# Patient Record
Sex: MALE | Race: BLACK OR AFRICAN AMERICAN | NOT HISPANIC OR LATINO | Employment: FULL TIME | URBAN - METROPOLITAN AREA
[De-identification: names, ages, dates, MRNs, and addresses within clinical notes are randomized per-mention and may not be internally consistent; named-entity substitution may affect disease eponyms.]

---

## 2021-01-04 ENCOUNTER — OFFICE VISIT (OUTPATIENT)
Dept: FAMILY MEDICINE | Facility: CLINIC | Age: 26
End: 2021-01-04
Payer: COMMERCIAL

## 2021-01-04 ENCOUNTER — TELEPHONE (OUTPATIENT)
Dept: FAMILY MEDICINE | Facility: CLINIC | Age: 26
End: 2021-01-04

## 2021-01-04 VITALS
BODY MASS INDEX: 40.43 KG/M2 | HEIGHT: 74 IN | DIASTOLIC BLOOD PRESSURE: 82 MMHG | TEMPERATURE: 97 F | OXYGEN SATURATION: 97 % | RESPIRATION RATE: 18 BRPM | HEART RATE: 105 BPM | WEIGHT: 315 LBS | SYSTOLIC BLOOD PRESSURE: 110 MMHG

## 2021-01-04 DIAGNOSIS — Z23 NEED FOR PROPHYLACTIC VACCINATION AGAINST STREPTOCOCCUS PNEUMONIAE (PNEUMOCOCCUS): ICD-10-CM

## 2021-01-04 DIAGNOSIS — Z00.00 ANNUAL PHYSICAL EXAM: Primary | ICD-10-CM

## 2021-01-04 DIAGNOSIS — Z23 NEED FOR INFLUENZA VACCINATION: ICD-10-CM

## 2021-01-04 DIAGNOSIS — E66.01 MORBID OBESITY WITH BMI OF 40.0-44.9, ADULT: ICD-10-CM

## 2021-01-04 PROCEDURE — 90471 IMMUNIZATION ADMIN: CPT | Mod: S$GLB,,, | Performed by: FAMILY MEDICINE

## 2021-01-04 PROCEDURE — 90686 IIV4 VACC NO PRSV 0.5 ML IM: CPT | Mod: S$GLB,,, | Performed by: FAMILY MEDICINE

## 2021-01-04 PROCEDURE — 99385 PR PREVENTIVE VISIT,NEW,18-39: ICD-10-PCS | Mod: 25,S$GLB,, | Performed by: FAMILY MEDICINE

## 2021-01-04 PROCEDURE — 90472 PNEUMOCOCCAL POLYSACCHARIDE VACCINE 23-VALENT =>2YO SQ IM: ICD-10-PCS | Mod: S$GLB,,, | Performed by: FAMILY MEDICINE

## 2021-01-04 PROCEDURE — 90471 FLU VACCINE (QUAD) GREATER THAN OR EQUAL TO 3YO PRESERVATIVE FREE IM: ICD-10-PCS | Mod: S$GLB,,, | Performed by: FAMILY MEDICINE

## 2021-01-04 PROCEDURE — 90732 PNEUMOCOCCAL POLYSACCHARIDE VACCINE 23-VALENT =>2YO SQ IM: ICD-10-PCS | Mod: S$GLB,,, | Performed by: FAMILY MEDICINE

## 2021-01-04 PROCEDURE — 90732 PPSV23 VACC 2 YRS+ SUBQ/IM: CPT | Mod: S$GLB,,, | Performed by: FAMILY MEDICINE

## 2021-01-04 PROCEDURE — 90686 FLU VACCINE (QUAD) GREATER THAN OR EQUAL TO 3YO PRESERVATIVE FREE IM: ICD-10-PCS | Mod: S$GLB,,, | Performed by: FAMILY MEDICINE

## 2021-01-04 PROCEDURE — 99999 PR PBB SHADOW E&M-NEW PATIENT-LVL IV: CPT | Mod: PBBFAC,,, | Performed by: FAMILY MEDICINE

## 2021-01-04 PROCEDURE — 99385 PREV VISIT NEW AGE 18-39: CPT | Mod: 25,S$GLB,, | Performed by: FAMILY MEDICINE

## 2021-01-04 PROCEDURE — 99999 PR PBB SHADOW E&M-NEW PATIENT-LVL IV: ICD-10-PCS | Mod: PBBFAC,,, | Performed by: FAMILY MEDICINE

## 2021-01-04 PROCEDURE — 90472 IMMUNIZATION ADMIN EACH ADD: CPT | Mod: S$GLB,,, | Performed by: FAMILY MEDICINE

## 2021-01-04 RX ORDER — FEXOFENADINE HCL 60 MG
60 TABLET ORAL DAILY PRN
COMMUNITY

## 2021-01-27 ENCOUNTER — LAB VISIT (OUTPATIENT)
Dept: LAB | Facility: HOSPITAL | Age: 26
End: 2021-01-27
Attending: FAMILY MEDICINE
Payer: COMMERCIAL

## 2021-01-27 DIAGNOSIS — Z00.00 ANNUAL PHYSICAL EXAM: ICD-10-CM

## 2021-01-27 PROCEDURE — 84443 ASSAY THYROID STIM HORMONE: CPT

## 2021-01-27 PROCEDURE — 85025 COMPLETE CBC W/AUTO DIFF WBC: CPT

## 2021-01-27 PROCEDURE — 80061 LIPID PANEL: CPT

## 2021-01-27 PROCEDURE — 86703 HIV-1/HIV-2 1 RESULT ANTBDY: CPT

## 2021-01-27 PROCEDURE — 86803 HEPATITIS C AB TEST: CPT

## 2021-01-27 PROCEDURE — 80053 COMPREHEN METABOLIC PANEL: CPT

## 2021-01-27 PROCEDURE — 36415 COLL VENOUS BLD VENIPUNCTURE: CPT | Mod: PO

## 2021-01-28 LAB
ALBUMIN SERPL BCP-MCNC: 4.1 G/DL (ref 3.5–5.2)
ALP SERPL-CCNC: 69 U/L (ref 55–135)
ALT SERPL W/O P-5'-P-CCNC: 28 U/L (ref 10–44)
ANION GAP SERPL CALC-SCNC: 11 MMOL/L (ref 8–16)
AST SERPL-CCNC: 31 U/L (ref 10–40)
BASOPHILS # BLD AUTO: 0.03 K/UL (ref 0–0.2)
BASOPHILS NFR BLD: 0.5 % (ref 0–1.9)
BILIRUB SERPL-MCNC: 0.5 MG/DL (ref 0.1–1)
BUN SERPL-MCNC: 19 MG/DL (ref 6–20)
CALCIUM SERPL-MCNC: 9.5 MG/DL (ref 8.7–10.5)
CHLORIDE SERPL-SCNC: 104 MMOL/L (ref 95–110)
CHOLEST SERPL-MCNC: 185 MG/DL (ref 120–199)
CHOLEST/HDLC SERPL: 2.9 {RATIO} (ref 2–5)
CO2 SERPL-SCNC: 23 MMOL/L (ref 23–29)
CREAT SERPL-MCNC: 1.1 MG/DL (ref 0.5–1.4)
DIFFERENTIAL METHOD: ABNORMAL
EOSINOPHIL # BLD AUTO: 0 K/UL (ref 0–0.5)
EOSINOPHIL NFR BLD: 0.5 % (ref 0–8)
ERYTHROCYTE [DISTWIDTH] IN BLOOD BY AUTOMATED COUNT: 12.9 % (ref 11.5–14.5)
EST. GFR  (AFRICAN AMERICAN): >60 ML/MIN/1.73 M^2
EST. GFR  (NON AFRICAN AMERICAN): >60 ML/MIN/1.73 M^2
GLUCOSE SERPL-MCNC: 65 MG/DL (ref 70–110)
HCT VFR BLD AUTO: 47.6 % (ref 40–54)
HCV AB SERPL QL IA: NEGATIVE
HDLC SERPL-MCNC: 64 MG/DL (ref 40–75)
HDLC SERPL: 34.6 % (ref 20–50)
HGB BLD-MCNC: 15.4 G/DL (ref 14–18)
HIV 1+2 AB+HIV1 P24 AG SERPL QL IA: NEGATIVE
IMM GRANULOCYTES # BLD AUTO: 0.03 K/UL (ref 0–0.04)
IMM GRANULOCYTES NFR BLD AUTO: 0.5 % (ref 0–0.5)
LDLC SERPL CALC-MCNC: 112 MG/DL (ref 63–159)
LYMPHOCYTES # BLD AUTO: 2 K/UL (ref 1–4.8)
LYMPHOCYTES NFR BLD: 33.7 % (ref 18–48)
MCH RBC QN AUTO: 31.4 PG (ref 27–31)
MCHC RBC AUTO-ENTMCNC: 32.4 G/DL (ref 32–36)
MCV RBC AUTO: 97 FL (ref 82–98)
MONOCYTES # BLD AUTO: 0.5 K/UL (ref 0.3–1)
MONOCYTES NFR BLD: 8.8 % (ref 4–15)
NEUTROPHILS # BLD AUTO: 3.4 K/UL (ref 1.8–7.7)
NEUTROPHILS NFR BLD: 56 % (ref 38–73)
NONHDLC SERPL-MCNC: 121 MG/DL
NRBC BLD-RTO: 0 /100 WBC
PLATELET # BLD AUTO: 245 K/UL (ref 150–350)
PMV BLD AUTO: 10.7 FL (ref 9.2–12.9)
POTASSIUM SERPL-SCNC: 4.4 MMOL/L (ref 3.5–5.1)
PROT SERPL-MCNC: 7.6 G/DL (ref 6–8.4)
RBC # BLD AUTO: 4.91 M/UL (ref 4.6–6.2)
SODIUM SERPL-SCNC: 138 MMOL/L (ref 136–145)
TRIGL SERPL-MCNC: 45 MG/DL (ref 30–150)
TSH SERPL DL<=0.005 MIU/L-ACNC: 0.62 UIU/ML (ref 0.4–4)
WBC # BLD AUTO: 6.03 K/UL (ref 3.9–12.7)

## 2021-02-19 ENCOUNTER — PATIENT MESSAGE (OUTPATIENT)
Dept: FAMILY MEDICINE | Facility: CLINIC | Age: 26
End: 2021-02-19

## 2021-03-06 ENCOUNTER — IMMUNIZATION (OUTPATIENT)
Dept: PRIMARY CARE CLINIC | Facility: CLINIC | Age: 26
End: 2021-03-06
Payer: COMMERCIAL

## 2021-03-06 DIAGNOSIS — Z23 NEED FOR VACCINATION: Primary | ICD-10-CM

## 2021-03-06 PROCEDURE — 0001A PR IMMUNIZ ADMIN, SARS-COV-2 COVID-19 VACC, 30MCG/0.3ML, 1ST DOSE: ICD-10-PCS | Mod: CV19,S$GLB,, | Performed by: INTERNAL MEDICINE

## 2021-03-06 PROCEDURE — 0001A PR IMMUNIZ ADMIN, SARS-COV-2 COVID-19 VACC, 30MCG/0.3ML, 1ST DOSE: CPT | Mod: CV19,S$GLB,, | Performed by: INTERNAL MEDICINE

## 2021-03-06 PROCEDURE — 91300 PR SARS-COV- 2 COVID-19 VACCINE, NO PRSV, 30MCG/0.3ML, IM: ICD-10-PCS | Mod: S$GLB,,, | Performed by: INTERNAL MEDICINE

## 2021-03-06 PROCEDURE — 91300 PR SARS-COV- 2 COVID-19 VACCINE, NO PRSV, 30MCG/0.3ML, IM: CPT | Mod: S$GLB,,, | Performed by: INTERNAL MEDICINE

## 2021-03-06 RX ADMIN — Medication 0.3 ML: at 10:03

## 2021-03-27 ENCOUNTER — IMMUNIZATION (OUTPATIENT)
Dept: PRIMARY CARE CLINIC | Facility: CLINIC | Age: 26
End: 2021-03-27
Payer: COMMERCIAL

## 2021-03-27 DIAGNOSIS — Z23 NEED FOR VACCINATION: Primary | ICD-10-CM

## 2021-03-27 PROCEDURE — 0002A PR IMMUNIZ ADMIN, SARS-COV-2 COVID-19 VACC, 30MCG/0.3ML, 2ND DOSE: ICD-10-PCS | Mod: CV19,S$GLB,, | Performed by: INTERNAL MEDICINE

## 2021-03-27 PROCEDURE — 91300 PR SARS-COV- 2 COVID-19 VACCINE, NO PRSV, 30MCG/0.3ML, IM: ICD-10-PCS | Mod: S$GLB,,, | Performed by: INTERNAL MEDICINE

## 2021-03-27 PROCEDURE — 0002A PR IMMUNIZ ADMIN, SARS-COV-2 COVID-19 VACC, 30MCG/0.3ML, 2ND DOSE: CPT | Mod: CV19,S$GLB,, | Performed by: INTERNAL MEDICINE

## 2021-03-27 PROCEDURE — 91300 PR SARS-COV- 2 COVID-19 VACCINE, NO PRSV, 30MCG/0.3ML, IM: CPT | Mod: S$GLB,,, | Performed by: INTERNAL MEDICINE

## 2021-03-27 RX ADMIN — Medication 0.3 ML: at 10:03

## 2021-04-06 ENCOUNTER — TELEPHONE (OUTPATIENT)
Dept: FAMILY MEDICINE | Facility: CLINIC | Age: 26
End: 2021-04-06

## 2021-04-14 ENCOUNTER — TELEPHONE (OUTPATIENT)
Dept: FAMILY MEDICINE | Facility: CLINIC | Age: 26
End: 2021-04-14

## 2021-04-15 ENCOUNTER — OFFICE VISIT (OUTPATIENT)
Dept: FAMILY MEDICINE | Facility: CLINIC | Age: 26
End: 2021-04-15
Payer: COMMERCIAL

## 2021-04-15 VITALS
WEIGHT: 315 LBS | DIASTOLIC BLOOD PRESSURE: 86 MMHG | BODY MASS INDEX: 40.43 KG/M2 | OXYGEN SATURATION: 98 % | TEMPERATURE: 97 F | HEART RATE: 86 BPM | SYSTOLIC BLOOD PRESSURE: 122 MMHG | HEIGHT: 74 IN

## 2021-04-15 DIAGNOSIS — M25.532 BILATERAL WRIST PAIN: Primary | ICD-10-CM

## 2021-04-15 DIAGNOSIS — M25.531 BILATERAL WRIST PAIN: Primary | ICD-10-CM
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
## 2021-04-15 PROCEDURE — 99999 PR PBB SHADOW E&M-EST. PATIENT-LVL III: CPT | Mod: PBBFAC,,, | Performed by: INTERNAL MEDICINE

## 2021-04-15 PROCEDURE — 99214 PR OFFICE/OUTPT VISIT, EST, LEVL IV, 30-39 MIN: ICD-10-PCS | Mod: S$GLB,,, | Performed by: INTERNAL MEDICINE

## 2021-04-15 PROCEDURE — 99214 OFFICE O/P EST MOD 30 MIN: CPT | Mod: S$GLB,,, | Performed by: INTERNAL MEDICINE

## 2021-04-15 PROCEDURE — 99999 PR PBB SHADOW E&M-EST. PATIENT-LVL III: ICD-10-PCS | Mod: PBBFAC,,, | Performed by: INTERNAL MEDICINE

## 2021-08-18 ENCOUNTER — CLINICAL SUPPORT (OUTPATIENT)
Dept: URGENT CARE | Facility: CLINIC | Age: 26
End: 2021-08-18
Payer: COMMERCIAL

## 2021-08-18 ENCOUNTER — LAB VISIT (OUTPATIENT)
Dept: PRIMARY CARE CLINIC | Facility: OTHER | Age: 26
End: 2021-08-18
Payer: COMMERCIAL

## 2021-08-18 DIAGNOSIS — Z20.822 ENCOUNTER FOR LABORATORY TESTING FOR COVID-19 VIRUS: ICD-10-CM

## 2021-08-18 DIAGNOSIS — Z11.52 ENCOUNTER FOR SCREENING FOR COVID-19: Primary | ICD-10-CM

## 2021-08-18 LAB
CTP QC/QA: YES
SARS-COV-2 RDRP RESP QL NAA+PROBE: NEGATIVE

## 2021-08-18 PROCEDURE — 99211 OFF/OP EST MAY X REQ PHY/QHP: CPT | Mod: S$GLB,CS,, | Performed by: NURSE PRACTITIONER

## 2021-08-18 PROCEDURE — U0002: ICD-10-PCS | Mod: QW,S$GLB,, | Performed by: NURSE PRACTITIONER

## 2021-08-18 PROCEDURE — 99211 PR OFFICE/OUTPT VISIT, EST, LEVL I: ICD-10-PCS | Mod: S$GLB,CS,, | Performed by: NURSE PRACTITIONER

## 2021-08-18 PROCEDURE — U0002 COVID-19 LAB TEST NON-CDC: HCPCS | Mod: QW,S$GLB,, | Performed by: NURSE PRACTITIONER

## 2021-08-18 PROCEDURE — U0003 INFECTIOUS AGENT DETECTION BY NUCLEIC ACID (DNA OR RNA); SEVERE ACUTE RESPIRATORY SYNDROME CORONAVIRUS 2 (SARS-COV-2) (CORONAVIRUS DISEASE [COVID-19]), AMPLIFIED PROBE TECHNIQUE, MAKING USE OF HIGH THROUGHPUT TECHNOLOGIES AS DESCRIBED BY CMS-2020-01-R: HCPCS

## 2021-08-20 LAB
SARS-COV-2 RNA RESP QL NAA+PROBE: NORMAL
TEST PERFORMANCE INFO SPEC: NORMAL

## 2021-10-26 ENCOUNTER — OFFICE VISIT (OUTPATIENT)
Dept: URGENT CARE | Facility: CLINIC | Age: 26
End: 2021-10-26
Payer: COMMERCIAL

## 2021-10-26 VITALS
DIASTOLIC BLOOD PRESSURE: 72 MMHG | BODY MASS INDEX: 40.43 KG/M2 | SYSTOLIC BLOOD PRESSURE: 126 MMHG | HEIGHT: 74 IN | HEART RATE: 93 BPM | RESPIRATION RATE: 18 BRPM | OXYGEN SATURATION: 98 % | WEIGHT: 315 LBS | TEMPERATURE: 98 F

## 2021-10-26 DIAGNOSIS — J45.909 MODERATE ASTHMA, UNSPECIFIED WHETHER COMPLICATED, UNSPECIFIED WHETHER PERSISTENT: Primary | ICD-10-CM

## 2021-10-26 DIAGNOSIS — R05.9 COUGH: ICD-10-CM

## 2021-10-26 PROCEDURE — 99213 PR OFFICE/OUTPT VISIT, EST, LEVL III, 20-29 MIN: ICD-10-PCS | Mod: S$GLB,,, | Performed by: PHYSICIAN ASSISTANT

## 2021-10-26 PROCEDURE — 99213 OFFICE O/P EST LOW 20 MIN: CPT | Mod: S$GLB,,, | Performed by: PHYSICIAN ASSISTANT

## 2021-10-26 RX ORDER — ALBUTEROL SULFATE 90 UG/1
2 AEROSOL, METERED RESPIRATORY (INHALATION) EVERY 6 HOURS PRN
Qty: 8 G | Refills: 0 | Status: SHIPPED | OUTPATIENT
Start: 2021-10-26 | End: 2023-11-18 | Stop reason: SDUPTHER

## 2021-10-26 RX ORDER — METHYLPREDNISOLONE 4 MG/1
TABLET ORAL
Qty: 1 EACH | Refills: 0 | Status: SHIPPED | OUTPATIENT
Start: 2021-10-26 | End: 2023-04-18

## 2021-10-26 RX ORDER — ALBUTEROL SULFATE 0.63 MG/3ML
0.63 SOLUTION RESPIRATORY (INHALATION) EVERY 6 HOURS PRN
COMMUNITY

## 2021-10-26 RX ORDER — METHYLPREDNISOLONE 4 MG/1
4 TABLET ORAL
COMMUNITY
End: 2021-10-26

## 2021-10-29 ENCOUNTER — TELEPHONE (OUTPATIENT)
Dept: URGENT CARE | Facility: CLINIC | Age: 26
End: 2021-10-29
Payer: COMMERCIAL

## 2021-12-09 ENCOUNTER — TELEPHONE (OUTPATIENT)
Dept: FAMILY MEDICINE | Facility: CLINIC | Age: 26
End: 2021-12-09
Payer: COMMERCIAL

## 2021-12-16 ENCOUNTER — IMMUNIZATION (OUTPATIENT)
Dept: PRIMARY CARE CLINIC | Facility: CLINIC | Age: 26
End: 2021-12-16
Payer: COMMERCIAL

## 2021-12-16 ENCOUNTER — OFFICE VISIT (OUTPATIENT)
Dept: FAMILY MEDICINE | Facility: CLINIC | Age: 26
End: 2021-12-16
Payer: COMMERCIAL

## 2021-12-16 ENCOUNTER — TELEPHONE (OUTPATIENT)
Dept: SPORTS MEDICINE | Facility: CLINIC | Age: 26
End: 2021-12-16
Payer: COMMERCIAL

## 2021-12-16 VITALS
SYSTOLIC BLOOD PRESSURE: 135 MMHG | BODY MASS INDEX: 40.43 KG/M2 | OXYGEN SATURATION: 97 % | WEIGHT: 315 LBS | TEMPERATURE: 99 F | DIASTOLIC BLOOD PRESSURE: 75 MMHG | HEART RATE: 100 BPM | HEIGHT: 74 IN

## 2021-12-16 DIAGNOSIS — Z23 NEED FOR VACCINATION: Primary | ICD-10-CM

## 2021-12-16 DIAGNOSIS — M25.571 ACUTE RIGHT ANKLE PAIN: Primary | ICD-10-CM

## 2021-12-16 PROCEDURE — 90686 IIV4 VACC NO PRSV 0.5 ML IM: CPT | Mod: S$GLB,,, | Performed by: FAMILY MEDICINE

## 2021-12-16 PROCEDURE — 99999 PR PBB SHADOW E&M-EST. PATIENT-LVL IV: CPT | Mod: PBBFAC,,, | Performed by: FAMILY MEDICINE

## 2021-12-16 PROCEDURE — 90686 FLU VACCINE (QUAD) GREATER THAN OR EQUAL TO 3YO PRESERVATIVE FREE IM: ICD-10-PCS | Mod: S$GLB,,, | Performed by: FAMILY MEDICINE

## 2021-12-16 PROCEDURE — 99214 PR OFFICE/OUTPT VISIT, EST, LEVL IV, 30-39 MIN: ICD-10-PCS | Mod: 25,S$GLB,, | Performed by: FAMILY MEDICINE

## 2021-12-16 PROCEDURE — 99999 PR PBB SHADOW E&M-EST. PATIENT-LVL IV: ICD-10-PCS | Mod: PBBFAC,,, | Performed by: FAMILY MEDICINE

## 2021-12-16 PROCEDURE — 90471 IMMUNIZATION ADMIN: CPT | Mod: S$GLB,,, | Performed by: FAMILY MEDICINE

## 2021-12-16 PROCEDURE — 99214 OFFICE O/P EST MOD 30 MIN: CPT | Mod: 25,S$GLB,, | Performed by: FAMILY MEDICINE

## 2021-12-16 PROCEDURE — 0064A COVID-19, MRNA, LNP-S, PF, 100 MCG/0.25 ML DOSE VACCINE (MODERNA BOOSTER): CPT | Mod: CV19,PBBFAC | Performed by: FAMILY MEDICINE

## 2021-12-16 PROCEDURE — 90471 FLU VACCINE (QUAD) GREATER THAN OR EQUAL TO 3YO PRESERVATIVE FREE IM: ICD-10-PCS | Mod: S$GLB,,, | Performed by: FAMILY MEDICINE

## 2021-12-16 RX ORDER — NAPROXEN 250 MG/1
TABLET ORAL
COMMUNITY
Start: 2021-11-07

## 2021-12-21 ENCOUNTER — PATIENT MESSAGE (OUTPATIENT)
Dept: INFECTIOUS DISEASES | Facility: HOSPITAL | Age: 26
End: 2021-12-21
Payer: COMMERCIAL

## 2021-12-21 ENCOUNTER — OFFICE VISIT (OUTPATIENT)
Dept: URGENT CARE | Facility: CLINIC | Age: 26
End: 2021-12-21
Payer: COMMERCIAL

## 2021-12-21 VITALS
RESPIRATION RATE: 18 BRPM | TEMPERATURE: 103 F | SYSTOLIC BLOOD PRESSURE: 134 MMHG | HEART RATE: 129 BPM | DIASTOLIC BLOOD PRESSURE: 71 MMHG | HEIGHT: 74 IN | BODY MASS INDEX: 40.43 KG/M2 | WEIGHT: 315 LBS | OXYGEN SATURATION: 97 %

## 2021-12-21 DIAGNOSIS — U07.1 COVID-19 VIRUS DETECTED: ICD-10-CM

## 2021-12-21 DIAGNOSIS — U07.1 COVID-19: Primary | ICD-10-CM

## 2021-12-21 DIAGNOSIS — R05.9 COUGH: ICD-10-CM

## 2021-12-21 DIAGNOSIS — R50.81 FEVER IN OTHER DISEASES: ICD-10-CM

## 2021-12-21 LAB
CTP QC/QA: YES
CTP QC/QA: YES
POC MOLECULAR INFLUENZA A AGN: NEGATIVE
POC MOLECULAR INFLUENZA B AGN: NEGATIVE
SARS-COV-2 RDRP RESP QL NAA+PROBE: POSITIVE

## 2021-12-21 PROCEDURE — 99214 PR OFFICE/OUTPT VISIT, EST, LEVL IV, 30-39 MIN: ICD-10-PCS | Mod: S$GLB,,, | Performed by: PHYSICIAN ASSISTANT

## 2021-12-21 PROCEDURE — 87502 INFLUENZA DNA AMP PROBE: CPT | Mod: QW,S$GLB,, | Performed by: PHYSICIAN ASSISTANT

## 2021-12-21 PROCEDURE — U0002 COVID-19 LAB TEST NON-CDC: HCPCS | Mod: QW,S$GLB,, | Performed by: PHYSICIAN ASSISTANT

## 2021-12-21 PROCEDURE — 99214 OFFICE O/P EST MOD 30 MIN: CPT | Mod: S$GLB,,, | Performed by: PHYSICIAN ASSISTANT

## 2021-12-21 PROCEDURE — U0002: ICD-10-PCS | Mod: QW,S$GLB,, | Performed by: PHYSICIAN ASSISTANT

## 2021-12-21 PROCEDURE — 87502 POCT INFLUENZA A/B MOLECULAR: ICD-10-PCS | Mod: QW,S$GLB,, | Performed by: PHYSICIAN ASSISTANT

## 2021-12-21 RX ORDER — BENZONATATE 100 MG/1
100 CAPSULE ORAL 3 TIMES DAILY PRN
Qty: 21 CAPSULE | Refills: 0 | Status: SHIPPED | OUTPATIENT
Start: 2021-12-21 | End: 2021-12-28

## 2021-12-21 RX ORDER — PROMETHAZINE HYDROCHLORIDE AND DEXTROMETHORPHAN HYDROBROMIDE 6.25; 15 MG/5ML; MG/5ML
5 SYRUP ORAL EVERY 6 HOURS PRN
Qty: 118 ML | Refills: 0 | Status: SHIPPED | OUTPATIENT
Start: 2021-12-21 | End: 2021-12-31

## 2021-12-21 RX ORDER — ACETAMINOPHEN 500 MG
1000 TABLET ORAL
Status: COMPLETED | OUTPATIENT
Start: 2021-12-21 | End: 2021-12-21

## 2021-12-21 RX ADMIN — Medication 1000 MG: at 11:12

## 2022-01-05 ENCOUNTER — PATIENT OUTREACH (OUTPATIENT)
Dept: ADMINISTRATIVE | Facility: OTHER | Age: 27
End: 2022-01-05
Payer: COMMERCIAL

## 2022-01-05 DIAGNOSIS — G89.29 CHRONIC PAIN OF RIGHT ANKLE: Primary | ICD-10-CM

## 2022-01-05 DIAGNOSIS — M25.571 CHRONIC PAIN OF RIGHT ANKLE: Primary | ICD-10-CM

## 2022-01-20 ENCOUNTER — OFFICE VISIT (OUTPATIENT)
Dept: PODIATRY | Facility: CLINIC | Age: 27
End: 2022-01-20
Payer: COMMERCIAL

## 2022-01-20 ENCOUNTER — HOSPITAL ENCOUNTER (OUTPATIENT)
Dept: RADIOLOGY | Facility: HOSPITAL | Age: 27
Discharge: HOME | End: 2022-01-20
Attending: PODIATRIST
Payer: COMMERCIAL

## 2022-01-20 VITALS — BODY MASS INDEX: 40.43 KG/M2 | WEIGHT: 315 LBS | HEIGHT: 74 IN

## 2022-01-20 DIAGNOSIS — G89.29 CHRONIC PAIN OF RIGHT ANKLE: ICD-10-CM

## 2022-01-20 DIAGNOSIS — M25.571 CHRONIC PAIN OF RIGHT ANKLE: ICD-10-CM

## 2022-01-20 DIAGNOSIS — S93.401A MODERATE ANKLE SPRAIN, RIGHT, INITIAL ENCOUNTER: Primary | ICD-10-CM

## 2022-01-20 DIAGNOSIS — E66.01 MORBID OBESITY WITH BMI OF 40.0-44.9, ADULT: ICD-10-CM

## 2022-01-20 DIAGNOSIS — M21.42 PES PLANUS OF BOTH FEET: ICD-10-CM

## 2022-01-20 DIAGNOSIS — M21.41 PES PLANUS OF BOTH FEET: ICD-10-CM

## 2022-01-20 PROCEDURE — 99203 PR OFFICE/OUTPT VISIT, NEW, LEVL III, 30-44 MIN: ICD-10-PCS | Mod: S$GLB,,, | Performed by: PODIATRIST

## 2022-01-20 PROCEDURE — 99999 PR PBB SHADOW E&M-EST. PATIENT-LVL III: CPT | Mod: PBBFAC,,, | Performed by: PODIATRIST

## 2022-01-20 PROCEDURE — 73610 X-RAY EXAM OF ANKLE: CPT | Mod: 26,RT,, | Performed by: RADIOLOGY

## 2022-01-20 PROCEDURE — 99999 PR PBB SHADOW E&M-EST. PATIENT-LVL III: ICD-10-PCS | Mod: PBBFAC,,, | Performed by: PODIATRIST

## 2022-01-20 PROCEDURE — 73610 XR ANKLE COMPLETE 3 VIEW RIGHT: ICD-10-PCS | Mod: 26,RT,, | Performed by: RADIOLOGY

## 2022-01-20 PROCEDURE — 73610 X-RAY EXAM OF ANKLE: CPT | Mod: TC,RT

## 2022-01-20 PROCEDURE — 99203 OFFICE O/P NEW LOW 30 MIN: CPT | Mod: S$GLB,,, | Performed by: PODIATRIST

## 2022-01-20 NOTE — PROGRESS NOTES
[facility] -   PODIATRIC MEDICINE AND SURGERY  PROGRESS NOTE    Reason for Visit   Chief Complaint   Patient presents with    Ankle Pain     Pt c/o of  right posterior pain , pain level 1/10        HPI  Daniel Hernandez III is a 26 y.o. male w/ no significant PMH, who presents today after sustaining injury to Right ankle. Pt describes mechanism as twisting mechanism while dancing two months ago. They deny any LOC or proximal leg pain. Pt states unable to bear any weight on right initially. He did report to an urgent care and was diagnosed with ankle sprain. Pain has now completely resolved but he is here for follow up. \    Patient denies other pedal complaints at this time.    DOI: 2021      PMH  Past Medical History:   Diagnosis Date    Asthma     History of 2019 novel coronavirus disease (COVID-19) 2020       MEDS  Current Outpatient Medications on File Prior to Visit   Medication Sig Dispense Refill    albuterol (ACCUNEB) 0.63 mg/3 mL Nebu Take 0.63 mg by nebulization every 6 (six) hours as needed. Rescue      albuterol (PROVENTIL/VENTOLIN HFA) 90 mcg/actuation inhaler Inhale 2 puffs into the lungs every 6 (six) hours as needed for Wheezing or Shortness of Breath. Rescue 8 g 0    fexofenadine (ALLEGRA) 60 MG tablet Take 60 mg by mouth daily as needed.      methylPREDNISolone (MEDROL DOSEPACK) 4 mg tablet use as directed 1 each 0    naproxen (NAPROSYN) 250 MG tablet SMARTSI-2 Tablet(s) By Mouth Every 12 Hours PRN       No current facility-administered medications on file prior to visit.       PSH     Past Surgical History:   Procedure Laterality Date    None          ALL  Review of patient's allergies indicates:  No Known Allergies    SOC     Social History     Tobacco Use    Smoking status: Never Smoker    Smokeless tobacco: Never Used   Substance Use Topics    Alcohol use: Yes    Drug use: Yes     Frequency: 1.0 times per week     Types: Marijuana         Family HX    Family  "History   Problem Relation Age of Onset    No Known Problems Mother     Hypertension Father     Multiple myeloma Sister     Diabetes Neg Hx     Stroke Neg Hx     Heart attack Neg Hx     Thyroid disease Neg Hx     Hyperlipidemia Neg Hx             REVIEW OF SYSTEMS  General: Denies any fever or chills  Chest: Denies shortness of breath, wheezing, coughing, or sputum production  Heart: Denies chest pain, cold extremities, orthopenia, or reduced exercise tolerance  Musk: Positive for pain as noted to affected extremity in HPI   As noted above and per history of current illness above, otherwise negative in the remainder of the 14 systems.      PHYSICAL EXAM  Vitals:    01/20/22 1134   Weight: (!) 160.6 kg (354 lb 0.9 oz)   Height: 6' 2" (1.88 m)   PainSc:   1       General: This patient is well-developed, well-nourished and appears stated age, well-oriented to person, place and time, and cooperative and pleasant on today's visit    Lower Extremity Physical Exam    Vascular exam:   · Dorsalis pedis and posterior tibial pulses palpable 2/4 bilaterally.   · Capillary refill time immediate to the toes.   · Feet are warm to the touch. Skin temperature warm to warm from proximally to distally   · There are no varicosities, telangiectasias noted to bilateral foot and ankle regions.   · There are no ecchymoses noted to bilateral foot and ankle regions.   · There is  No edema noted     Dermatologic exam:   · Skin moist with healthy texture and turgor.  · There are no open ulcerations, lacerations, or fissures to bilateral foot and ankle regions.  · There is no  evidence of ecchymosis or fracture blisters. There are no open wounds noted.    Neuro:   · Epicritic sensation is intact as the patient is able to sense light touch to bilateral foot and ankle regions.   · Achilles and patellar deep tendon reflexes intact  · Babinski reflex absent    MSK:   + Wiggle toes   (-) pain at 5th metatarsal base  (-) pain at fibular " malleolus  (-) pain at medial malleolus  (-) pain upon palpation of tib-fib prox syndesmosis  (-) pain at ATFL, CFL, or PTFL  (-) pain at deltoid ligaments    IMAGING   Reviewed by me and I agree with radiologist findings, 3 views of foot/ankle, reveal:     FINDINGS:  No acute fracture or dislocation.  There is some well corticated ossification seen posterior to the posterior malleolus likely secondary to an old avulsion injury.  The ankle mortise is otherwise intact.     Impression:      FINDINGS:  No acute fracture or dislocation.  There is some well corticated ossification seen posterior to the posterior malleolus likely secondary to an old avulsion injury.  The ankle mortise is otherwise intact.     Impression:           ASSESSMENT  1. Moderate ankle sprain, right, initial encounter     2. Morbid obesity with BMI of 40.0-44.9, adult         PLAN  1. Patient was educated about clinical and imaging findings, and verbalizes understanding of above.     Diagnoses and all orders for this visit:  Moderate ankle sprain, right, initial encounter    Morbid obesity with BMI of 40.0-44.9, adult      2. Treatment plan: I reviewed xray findings with patient and informed him possible flect/avulsion injury posterior malleolus- age indeterminate. Pt is asymptomatic.  Ankle rehabilitation exercises provided. Rx inserts and shoe recommendations provided     3. RTC PRN    No future appointments.  Report Electronically Signed By:     Keisha Colin DPM   Podiatry  [facility]  1/20/2022

## 2022-01-20 NOTE — PATIENT INSTRUCTIONS
ANKLE SPRAIN REHABILITATION:  To help rehabilitate and regain strength and range of motion about your foot and ankle you need to retrain and build the muscles that surround the ankle.      Performing these foot/ankle exercises with your uninjured side will give you a good baseline and reference point.    Alphabet Exercises:  To do this you should pretend as if your big toe is a pencil and write the alphabet in capital letters in mid air.  When doing this be sure not to move your hip or your knee.  You should do the entire alphabet twice a day.  When first starting this, you may not be able to complete the entire alphabet.  This is OK.  Do as much as you can at each session.    Box Exercises:  Start with your foot in a maximally dorsiflexed (toes to your nose) position and maximally inverted (pointed toward your other leg). Keeping your foot inverted, move your foot into maximal plantarflexion (pointed downward). Keeping your foot plantarflexed, move into maximal eversion (pointed outward). Finally, keeping you foot everted, dorsiflex your foot.  Repeat these motions for a total of 10 repetitions, if possible.     Balance Exercises:  Stand on one leg for up to one minute. I recommend standing on your uninjured leg first to serve as a baseline. Now attempt the same with your injured leg. Repeat three times/side. Once you can stand on your injured side for one minute (this may take a few weeks), you can increase the duration up to five minutes. For even more of a challenge, you can close your eyes. Be sure to stand next to a wall or table in case you loose your balance.     Contrast Soaks:  To help you reduce inflammation I recommend a contrast soak:   - 7 minutes of application of ice   - 7 minutes of application of heat   - 7 minutes of application of ice    You can use a bag of frozen vegetables for the ice application as this conforms to the contours of your foot and ankle.  For the heat you can either place your  foot in warm water, a hot tub, or heating pad.

## 2022-02-25 ENCOUNTER — PATIENT MESSAGE (OUTPATIENT)
Dept: RESEARCH | Facility: HOSPITAL | Age: 27
End: 2022-02-25
Payer: COMMERCIAL

## 2022-07-25 ENCOUNTER — OFFICE VISIT (OUTPATIENT)
Dept: URGENT CARE | Facility: CLINIC | Age: 27
End: 2022-07-25
Payer: COMMERCIAL

## 2022-07-25 VITALS
DIASTOLIC BLOOD PRESSURE: 80 MMHG | HEART RATE: 70 BPM | RESPIRATION RATE: 18 BRPM | OXYGEN SATURATION: 97 % | TEMPERATURE: 98 F | HEIGHT: 74 IN | WEIGHT: 315 LBS | BODY MASS INDEX: 40.43 KG/M2 | SYSTOLIC BLOOD PRESSURE: 155 MMHG
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
## 2022-07-25 DIAGNOSIS — R03.0 ELEVATED BLOOD PRESSURE READING: ICD-10-CM

## 2022-07-25 DIAGNOSIS — R10.9 ABDOMINAL DISCOMFORT: ICD-10-CM

## 2022-07-25 DIAGNOSIS — M54.50 ACUTE BILATERAL LOW BACK PAIN WITHOUT SCIATICA: Primary | ICD-10-CM

## 2022-07-25 DIAGNOSIS — M79.10 MUSCLE TENSION PAIN: ICD-10-CM

## 2022-07-25 PROCEDURE — 99214 PR OFFICE/OUTPT VISIT, EST, LEVL IV, 30-39 MIN: ICD-10-PCS | Mod: 25,S$GLB,, | Performed by: PHYSICIAN ASSISTANT

## 2022-07-25 PROCEDURE — 96372 THER/PROPH/DIAG INJ SC/IM: CPT | Mod: S$GLB,,, | Performed by: PHYSICIAN ASSISTANT

## 2022-07-25 PROCEDURE — 99214 OFFICE O/P EST MOD 30 MIN: CPT | Mod: 25,S$GLB,, | Performed by: PHYSICIAN ASSISTANT

## 2022-07-25 PROCEDURE — 96372 PR INJECTION,THERAP/PROPH/DIAG2ST, IM OR SUBCUT: ICD-10-PCS | Mod: S$GLB,,, | Performed by: PHYSICIAN ASSISTANT

## 2022-07-25 RX ORDER — METHOCARBAMOL 500 MG/1
500 TABLET, FILM COATED ORAL 3 TIMES DAILY PRN
Qty: 15 TABLET | Refills: 0 | Status: SHIPPED | OUTPATIENT
Start: 2022-07-25 | End: 2022-07-30

## 2022-07-25 RX ORDER — KETOROLAC TROMETHAMINE 30 MG/ML
30 INJECTION, SOLUTION INTRAMUSCULAR; INTRAVENOUS
Status: COMPLETED | OUTPATIENT
Start: 2022-07-25 | End: 2022-07-25

## 2022-07-25 RX ADMIN — KETOROLAC TROMETHAMINE 30 MG: 30 INJECTION, SOLUTION INTRAMUSCULAR; INTRAVENOUS at 04:07

## 2022-07-25 NOTE — PATIENT INSTRUCTIONS
BACK PAIN:      Please avoid heavy lifting and strenuous exercise for the next several days.    - Alternate heat and ice for first 48 hours then  apply heat.   - You may do gently stretching within your limits of pain.  *Moist warm compresss to area several times daily.    May use a heating pad on LOW to provide heat over a towel which was dampended with warm water. DO NOT FALL ASLEEP WITH HEATING PAD ON.    - Do not stay in one position to long.    - When sleeping on your back place a pillow under knees to reduce tension on back.    - If sleeping on your side, place pillow between knees to keep spine in better alinement.    - Wear supportive shoes such as tennis shoes for support of the lower back.  Take any medication as directed.      30 mg Toradol injection given in clinic today since you did not mention any kidney, liver, or GI issues./     You have been prescribed  ROBAXIN for muscle pain.  This medication causes drowsiness.  Do not drink alcohol or operate motor vehicles while taking.      If you were not prescribed an anti-inflammatory medication, and if you do not have any history of stomach/intestinal ulcers, or kidney disease, or are not taking a blood thinner such as Coumadin, Plavix, Pradaxa, Eloquis, or Xaralta for example, it is OK to take over the counter Ibuprofen or Advil or Motrin or Aleve as directed WHICH ARE (NSAIDS).  Do not take these medications on an empty stomach.    R.I.C.E:    Rest, apply ice intermittently,Do not apply ice directly to skin. Wrap ice in cloth before applying.    OTC Tylenol (acetaminophen) up to 4,000 mg a day is safe for short periods and can be used for pain, and fever. However in high doses and prolonged use it can cause liver irritation.    OTC Ibuprofen (NSAID) is a non-steroidal anti-inflammatory that can be used for pain. However it can also cause stomach irritation if over used.    Of note: Aleve, Motrin, Advil, Mobic, meloxicam, and indomethacin are also other  names of NSAIDs.    OTC Voltaren topical cream may be applied for relief, as long as you do not have any allergy to the ingredients.    You will need to follow-up with orthopedics if your symptoms persist, as we discussed.    Go to the emergency department for any new or worsening symptoms including but not limited to: loss of control of your bowel and/or bladder, sensation loss in between your legs by your genitalia and/or rectum.     ELEVATED BLOOD PRESSURE READING:--could be due to pain today    - Your blood pressure was noted to be elevated in clinic today.-- please keep an eye on blood pressures.  - Record blood pressures and follow up with primary care doctor if blood pressures continue to be elevated.  - Here are a few generalized recommended lifestyle modifications proven to lower BPs--DASH diet, exercise, weight loss, reducing stress.  *Of note: above recommendations are generalized conservative lifestyle modifications that include but are not limited to above list.   Please do not attempt any of the above recommendations if they do not pertain to you or should cause overall detriment to your health.   Please call the clinic or your PCP with any questions you may have in regards to BP and lifestyle modifications.        - You must understand that you have received an Urgent Care treatment only and that you may be released before all of your medical problems are known or treated.   - You, the patient, will arrange for follow up care as instructed with your primary care provider or recommended specialist.   - If your condition worsens or fails to improve we recommend that you receive another evaluation at the ER immediately or contact your PCP to discuss your concerns, or return here.   - Please do not drive or make any important decisions for 24 hours if you have received any pain medications, sedatives or mood altering drugs during your visit.    Disclaimer: This document was drafted with the use of a voice  recognition device and is likely to have sound alike errors.
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
## 2022-07-28 ENCOUNTER — TELEPHONE (OUTPATIENT)
Dept: URGENT CARE | Facility: CLINIC | Age: 27
End: 2022-07-28
Payer: COMMERCIAL

## 2022-10-04 ENCOUNTER — E-VISIT (OUTPATIENT)
Dept: FAMILY MEDICINE | Facility: CLINIC | Age: 27
End: 2022-10-04
Payer: COMMERCIAL

## 2022-10-04 ENCOUNTER — PATIENT MESSAGE (OUTPATIENT)
Dept: FAMILY MEDICINE | Facility: CLINIC | Age: 27
End: 2022-10-04

## 2022-10-04 DIAGNOSIS — S90.211A SUBUNGUAL HEMATOMA OF GREAT TOE OF RIGHT FOOT, INITIAL ENCOUNTER: Primary | ICD-10-CM

## 2022-10-04 PROCEDURE — 99421 PR E&M, ONLINE DIGIT, EST, < 7 DAYS, 5-10 MINS: ICD-10-PCS | Mod: S$GLB,,, | Performed by: FAMILY MEDICINE

## 2022-10-04 PROCEDURE — 99421 OL DIG E/M SVC 5-10 MIN: CPT | Mod: S$GLB,,, | Performed by: FAMILY MEDICINE

## 2022-10-04 NOTE — PROGRESS NOTES
Patient ID: Daniel Hernandez III is a 27 y.o. male.    Chief Complaint: Lump    The patient initiated a request through [employer] on 10/4/2022 for evaluation and management with a chief complaint of Lump     I evaluated the questionnaire submission on October 4, 2022.    [facility] Peq Evisit Rash    10/4/2022  2:41 PM CDT - Filed by Patient   Do you agree to participate in an E-Visit? Yes   If you have any of the following symptoms, please present to your local ER or call 911:  I acknowledge   What is the main issue that you would like for your doctor to address today? I dropped a heavy object on my toe and there is blood elle underneath my toe nail.   Are you able to take your vital signs? No   How would you describe your skin problem? Lump or bump   When did your symptoms first appear? 9/13/2022   Where is it located?  Foot/Feet   Does it itch? No   Does it hurt? No   Is there discharge or drainage? No   Is there bleeding? No   Describe the character Spots   Describe the color Purple   Has it changed over time? No change   Frequency of skin problem Seasonally   Duration of the skin problem (how long does it stay when it is present) Never goes away   I have had a new exposure to No new exposures   What have you used to treat the skin problem? Nothing   If you have used anything for treatment, has it helped the symptoms? No   Other generalized symptoms that you associate with the rash No other symptoms   At least one photo is required for treatment to be provided. You can upload a maximum of three photos of the affected area.          Active Problem List with Overview Notes    Diagnosis Date Noted    Morbid obesity with BMI of 40.0-44.9, adult 01/04/2021      Recent Labs Obtained:  No visits with results within 7 Day(s) from this visit.   Latest known visit with results is:   Office Visit on 12/21/2021   Component Date Value Ref Range Status    POC Molecular Influenza A Ag 12/21/2021 Negative  Negative, Not Reported Final     POC Molecular Influenza B Ag 12/21/2021 Negative  Negative, Not Reported Final     Acceptable 12/21/2021 Yes   Final    POC Rapid COVID 12/21/2021 Positive (A)  Negative Final     Acceptable 12/21/2021 Yes   Final       Encounter Diagnosis   Name Primary?    Subungual hematoma of great toe of right foot, initial encounter Yes      Reassurance - Advised bruise underneath nail will resolve and disappear as nail grows out. However, it may take 6 to 9 months for nail to grow out.     No orders of the defined types were placed in this encounter.           E-Visit Time Tracking: 10 minutes

## 2023-04-19 ENCOUNTER — PATIENT MESSAGE (OUTPATIENT)
Dept: RESEARCH | Facility: HOSPITAL | Age: 28
End: 2023-04-19
Payer: COMMERCIAL

## 2023-04-19 DIAGNOSIS — M25.561 ACUTE PAIN OF BOTH KNEES: Primary | ICD-10-CM

## 2023-04-19 DIAGNOSIS — M25.562 ACUTE PAIN OF BOTH KNEES: Primary | ICD-10-CM

## 2023-04-19 PROBLEM — J45.909 ASTHMA: Status: ACTIVE | Noted: 2023-04-19

## 2023-04-20 ENCOUNTER — OFFICE VISIT (OUTPATIENT)
Dept: ORTHOPEDICS | Facility: CLINIC | Age: 28
End: 2023-04-20
Payer: COMMERCIAL

## 2023-04-20 ENCOUNTER — PATIENT MESSAGE (OUTPATIENT)
Dept: ORTHOPEDICS | Facility: CLINIC | Age: 28
End: 2023-04-20

## 2023-04-20 ENCOUNTER — HOSPITAL ENCOUNTER (OUTPATIENT)
Dept: RADIOLOGY | Facility: HOSPITAL | Age: 28
Discharge: HOME | End: 2023-04-20
Attending: ORTHOPAEDIC SURGERY
Payer: COMMERCIAL

## 2023-04-20 VITALS — BODY MASS INDEX: 40.43 KG/M2 | HEIGHT: 74 IN | WEIGHT: 315 LBS

## 2023-04-20 DIAGNOSIS — M25.461 EFFUSION OF BOTH KNEE JOINTS: ICD-10-CM

## 2023-04-20 DIAGNOSIS — M17.0 BILATERAL PRIMARY OSTEOARTHRITIS OF KNEE: ICD-10-CM

## 2023-04-20 DIAGNOSIS — M25.561 ACUTE PAIN OF BOTH KNEES: ICD-10-CM

## 2023-04-20 DIAGNOSIS — S89.91XA INJURY OF RIGHT KNEE, INITIAL ENCOUNTER: Primary | ICD-10-CM

## 2023-04-20 DIAGNOSIS — M25.562 ACUTE PAIN OF BOTH KNEES: ICD-10-CM

## 2023-04-20 DIAGNOSIS — M25.462 EFFUSION OF BOTH KNEE JOINTS: ICD-10-CM

## 2023-04-20 PROCEDURE — 73564 X-RAY EXAM KNEE 4 OR MORE: CPT | Mod: 26,,, | Performed by: RADIOLOGY

## 2023-04-20 PROCEDURE — 20610 DRAIN/INJ JOINT/BURSA W/O US: CPT | Mod: LT,S$GLB,, | Performed by: ORTHOPAEDIC SURGERY

## 2023-04-20 PROCEDURE — 73564 XR KNEE ORTHO BILAT WITH FLEXION: ICD-10-PCS | Mod: 26,,, | Performed by: RADIOLOGY

## 2023-04-20 PROCEDURE — 99204 PR OFFICE/OUTPT VISIT, NEW, LEVL IV, 45-59 MIN: ICD-10-PCS | Mod: 25,S$GLB,, | Performed by: ORTHOPAEDIC SURGERY

## 2023-04-20 PROCEDURE — 1159F PR MEDICATION LIST DOCUMENTED IN MEDICAL RECORD: ICD-10-PCS | Mod: CPTII,S$GLB,, | Performed by: ORTHOPAEDIC SURGERY

## 2023-04-20 PROCEDURE — 20610 LARGE JOINT ASPIRATION/INJECTION: L KNEE: ICD-10-PCS | Mod: LT,S$GLB,, | Performed by: ORTHOPAEDIC SURGERY

## 2023-04-20 PROCEDURE — 73564 X-RAY EXAM KNEE 4 OR MORE: CPT | Mod: TC,50

## 2023-04-20 PROCEDURE — 99999 PR PBB SHADOW E&M-EST. PATIENT-LVL III: ICD-10-PCS | Mod: PBBFAC,,, | Performed by: ORTHOPAEDIC SURGERY

## 2023-04-20 PROCEDURE — 99999 PR PBB SHADOW E&M-EST. PATIENT-LVL III: CPT | Mod: PBBFAC,,, | Performed by: ORTHOPAEDIC SURGERY

## 2023-04-20 PROCEDURE — 3008F PR BODY MASS INDEX (BMI) DOCUMENTED: ICD-10-PCS | Mod: CPTII,S$GLB,, | Performed by: ORTHOPAEDIC SURGERY

## 2023-04-20 PROCEDURE — 3008F BODY MASS INDEX DOCD: CPT | Mod: CPTII,S$GLB,, | Performed by: ORTHOPAEDIC SURGERY

## 2023-04-20 PROCEDURE — 1159F MED LIST DOCD IN RCRD: CPT | Mod: CPTII,S$GLB,, | Performed by: ORTHOPAEDIC SURGERY

## 2023-04-20 PROCEDURE — 99204 OFFICE O/P NEW MOD 45 MIN: CPT | Mod: 25,S$GLB,, | Performed by: ORTHOPAEDIC SURGERY

## 2023-04-20 RX ORDER — METHYLPREDNISOLONE ACETATE 80 MG/ML
80 INJECTION, SUSPENSION INTRA-ARTICULAR; INTRALESIONAL; INTRAMUSCULAR; SOFT TISSUE
Status: DISCONTINUED | OUTPATIENT
Start: 2023-04-20 | End: 2023-04-20 | Stop reason: HOSPADM

## 2023-04-20 RX ADMIN — METHYLPREDNISOLONE ACETATE 80 MG: 80 INJECTION, SUSPENSION INTRA-ARTICULAR; INTRALESIONAL; INTRAMUSCULAR; SOFT TISSUE at 08:04

## 2023-04-20 NOTE — PROCEDURES
Large Joint Aspiration/Injection: L knee    Date/Time: 4/20/2023 8:45 AM  Performed by: Ed Anand MD  Authorized by: Ed Anand MD     Consent Done?:  Yes (Verbal)  Indications:  Joint swelling and pain  Site marked: the procedure site was marked    Timeout: prior to procedure the correct patient, procedure, and site was verified    Prep: patient was prepped and draped in usual sterile fashion      Local anesthesia used?: Yes    Anesthesia:  Local infiltration  Local anesthetic:  Bupivacaine 0.5% without epinephrine, lidocaine 1% without epinephrine and topical anesthetic    Details:  Needle Size:  22 G  Ultrasonic Guidance for needle placement?: No    Approach:  Superior  Location:  Knee  Site:  L knee  Medications:  80 mg methylPREDNISolone acetate 80 mg/mL  Patient tolerance:  Patient tolerated the procedure well with no immediate complications     2cc 1% lidocaine plain, 2cc 0.5% marcaine plain, 0.5cc 80mg methylprednisolone    Procedure Note:  We discussed the risk and benefits of injections, including pain, infection, bleeding, damage to adjacent structures, risk of reaction to injection. We discussed the steroid/cortisone injections will not heal the problem but mat help decrease inflammation and help with symptoms. We discussed the risk of repeated injections. The patient expressed understanding and wanted to proceed with the injection. We performed a timeout to verify the proper patient, proper procedure, and the proper site. The injection site was prepared in a sterile fashion. The patient tolerated it well and there were no complication. We did discuss with the patient that steroid injections can cause some increase in blood sugar and blood pressure for up to a week after the injection.

## 2023-04-20 NOTE — PROGRESS NOTES
Patient ID: Daniel Hernandez III  YOB: 1995  MRN: [id number]    Chief Complaint: Injury and Pain of the Right Knee and Injury and Pain of the Left Knee      Referred By: My Chart    History of Present Illness: Daniel Hernandez III is a  27 y.o. male    with a chief complaint of Injury and Pain of the Right Knee and Injury and Pain of the Left Knee    Daniel is here today for bilateral knee pain. He rates his pain as a 1/10. He originally injured both knees on 3/18/2023. He stepped and turned on his R leg and felt a pop. He was assisted to the car and started to walk on L leg where he felt a buckle in his knee. He describes his pain now as dull. He states that it has gotten better. His pain is worsened by stairs and getting up and down from sitting. His pain is alleviated by rest and elevation. He is not in PT. He takes ibuprofen for pain prn.     HPI    Past Medical History:   Past Medical History:   Diagnosis Date    Asthma     History of 2019 novel coronavirus disease (COVID-19) 06/2020     Past Surgical History:   Procedure Laterality Date    None       Family History   Problem Relation Age of Onset    No Known Problems Mother     Hypertension Father     Multiple myeloma Sister     Diabetes Neg Hx     Stroke Neg Hx     Heart attack Neg Hx     Thyroid disease Neg Hx     Hyperlipidemia Neg Hx      Social History     Socioeconomic History    Marital status: Single   Occupational History    Occupation:    Tobacco Use    Smoking status: Never    Smokeless tobacco: Never   Substance and Sexual Activity    Alcohol use: Yes    Drug use: Yes     Frequency: 1.0 times per week     Types: Marijuana    Sexual activity: Yes     Partners: Female     Birth control/protection: Condom     Comment: 2 life-time partners     Medication List with Changes/Refills   Current Medications    ALBUTEROL (ACCUNEB) 0.63 MG/3 ML NEBU    Take 0.63 mg by nebulization every 6 (six) hours as needed.  Rescue    ALBUTEROL (PROVENTIL/VENTOLIN HFA) 90 MCG/ACTUATION INHALER    Inhale 2 puffs into the lungs every 6 (six) hours as needed for Wheezing or Shortness of Breath. Rescue    FEXOFENADINE (ALLEGRA) 60 MG TABLET    Take 60 mg by mouth daily as needed.    NAPROXEN (NAPROSYN) 250 MG TABLET    SMARTSI-2 Tablet(s) By Mouth Every 12 Hours PRN     Review of patient's allergies indicates:  No Known Allergies  ROS    Physical Exam:   Body mass index is 44.3 kg/m².  There were no vitals filed for this visit.   GENERAL: Well appearing, appropriate for stated age, no acute distress.  CARDIOVASCULAR: Pulses regular by peripheral palpation.  PULMONARY: Respirations are even and non-labored.  NEURO: Awake, alert, and oriented x 3.  PSYCH: Mood & affect are appropriate.  HEENT: Head is normocephalic and atraumatic.  Ortho/SPM Exam  ***    Imaging:    X-ray Knee Ortho Bilateral with Flexion  Narrative: EXAMINATION:  XR KNEE ORTHO BILAT WITH FLEXION    CLINICAL HISTORY:  Pain in right knee    TECHNIQUE:  AP standing of both knees, PA flexion standing views of both knees, and Merchant views of both knees were performed.  Lateral views of both knees were also performed.    COMPARISON  None    FINDINGS:  The joint spaces of all 3 compartments of both knees appear to be well maintained.  No definitive joint effusions are identified.  There is some soft tissue prominence seen anterior and superior to either patella without clear delineation of either quadriceps tendon.  Tendinosis of either quadriceps tendon is not excluded.  No acute fracture or dislocation.  Impression: 1.  As above    Electronically signed by: Sudeep Lees DO  Date:    2023  Time:    09:06    ***  Relevant imaging results reviewed and interpreted by me, discussed with the patient and / or family today. ***    Other Tests:     ***    There are no Patient Instructions on file for this visit.  Provider Note/Medical Decision Making: ***      I discussed  worrisome and red flag signs and symptoms with the patient. The patient expressed understanding and agreed to alert me immediately or to go to the emergency room if they experience any of these.   Treatment plan was developed with input from the patient/family, and they expressed understanding and agreement with the plan. All questions were answered today.          Ed Anand MD  Orthopaedic Surgery & Sports Medicine       Disclaimer: This note was prepared using a voice recognition system and is likely to have sound alike errors within the text.

## 2023-04-20 NOTE — PROGRESS NOTES
Patient ID: Daniel Hernandez III  YOB: 1995  MRN: [id number]    Chief Complaint: Injury and Pain of the Right Knee and Injury and Pain of the Left Knee      Referred By: My Chart    History of Present Illness: Daniel Hernandez III is a  27 y.o. male    with a chief complaint of Injury and Pain of the Right Knee and Injury and Pain of the Left Knee    Daniel is here today for bilateral knee pain. He rates his pain as a 1/10. He originally injured both knees on 3/18/2023. He stepped and turned on his R leg and felt a pop. He was assisted to the car and started to walk on L leg where he felt a buckle in his knee. He describes his pain now as dull. He states that it has gotten better. His pain is worsened by stairs and getting up and down from sitting. His pain is alleviated by rest and elevation. He is not in PT. He takes ibuprofen for pain prn.     HPI    Past Medical History:   Past Medical History:   Diagnosis Date    Asthma     History of 2019 novel coronavirus disease (COVID-19) 06/2020     Past Surgical History:   Procedure Laterality Date    None       Family History   Problem Relation Age of Onset    No Known Problems Mother     Hypertension Father     Multiple myeloma Sister     Diabetes Neg Hx     Stroke Neg Hx     Heart attack Neg Hx     Thyroid disease Neg Hx     Hyperlipidemia Neg Hx      Social History     Socioeconomic History    Marital status: Single   Occupational History    Occupation:    Tobacco Use    Smoking status: Never    Smokeless tobacco: Never   Substance and Sexual Activity    Alcohol use: Yes    Drug use: Yes     Frequency: 1.0 times per week     Types: Marijuana    Sexual activity: Yes     Partners: Female     Birth control/protection: Condom     Comment: 2 life-time partners     Medication List with Changes/Refills   Current Medications    ALBUTEROL (ACCUNEB) 0.63 MG/3 ML NEBU    Take 0.63 mg by nebulization every 6 (six) hours as needed.  Rescue    ALBUTEROL (PROVENTIL/VENTOLIN HFA) 90 MCG/ACTUATION INHALER    Inhale 2 puffs into the lungs every 6 (six) hours as needed for Wheezing or Shortness of Breath. Rescue    FEXOFENADINE (ALLEGRA) 60 MG TABLET    Take 60 mg by mouth daily as needed.    NAPROXEN (NAPROSYN) 250 MG TABLET    SMARTSI-2 Tablet(s) By Mouth Every 12 Hours PRN     Review of patient's allergies indicates:  No Known Allergies  ROS    Physical Exam:   Body mass index is 44.3 kg/m².  There were no vitals filed for this visit.   GENERAL: Well appearing, appropriate for stated age, no acute distress.  CARDIOVASCULAR: Pulses regular by peripheral palpation.  PULMONARY: Respirations are even and non-labored.  NEURO: Awake, alert, and oriented x 3.  PSYCH: Mood & affect are appropriate.  HEENT: Head is normocephalic and atraumatic.            Right Knee Exam     Inspection   Effusion: present    Tenderness   The patient is tender to palpation of the patella (quad tendon).    Range of Motion   Extension:  0   Flexion:  110     Tests   Ligament Examination   Lachman: normal (-1 to 2mm)   MCL - Valgus: normal (0 to 2mm)  LCL - Varus: normal  Patella   Patellar apprehension: negative    Other   Sensation: normal    Comments:  Intact EHL, FHL, gastrocsoleus, and tibialis anterior. Sensation intact to light touch in superficial peroneal, deep peroneal, tibial, sural, and saphenous nerve distributions. Foot warm and well perfused with capillary refill of less than 2 seconds and palpable pedal pulses.      Left Knee Exam     Inspection   Effusion: present    Tenderness   The patient tender to palpation of the medial retinaculum and patella.    Range of Motion   Extension:  0   Flexion:  110     Tests   Stability   Lachman: normal (-1 to 2mm)   MCL - Valgus: normal (0 to 2mm)  LCL - Varus: normal (0 to 2mm)  Patella   Patellar apprehension: negative    Other   Sensation: normal    Muscle Strength   Right Lower Extremity   Hip Abduction: 5/5    Quadriceps:  4/5   Hamstrin/5   Left Lower Extremity   Hip Abduction: 5/5   Quadriceps:  4/5   Hamstrin/5     Vascular Exam     Right Pulses  Dorsalis Pedis:      2+  Posterior Tibial:      2+        Left Pulses  Dorsalis Pedis:      2+  Posterior Tibial:      2+        Imaging:    X-ray Knee Ortho Bilateral with Flexion  Narrative: EXAMINATION:  XR KNEE ORTHO BILAT WITH FLEXION    CLINICAL HISTORY:  Pain in right knee    TECHNIQUE:  AP standing of both knees, PA flexion standing views of both knees, and Merchant views of both knees were performed.  Lateral views of both knees were also performed.    COMPARISON  None    FINDINGS:  The joint spaces of all 3 compartments of both knees appear to be well maintained.  No definitive joint effusions are identified.  There is some soft tissue prominence seen anterior and superior to either patella without clear delineation of either quadriceps tendon.  Tendinosis of either quadriceps tendon is not excluded.  No acute fracture or dislocation.  Impression: 1.  As above    Electronically signed by: Sudeep Lees DO  Date:    2023  Time:    09:06      Relevant imaging results reviewed and interpreted by me, discussed with the patient and / or family today.     Other Tests:         Patient Instructions   Assessment:  Daniel Hernandez III is a  27 y.o. male    with a chief complaint of Injury and Pain of the Right Knee and Injury and Pain of the Left Knee    Right knee Injury with buckling event  Possible patella dislocation  Effusion of both knees   Early Osteoarthritis of both knees     Encounter Diagnoses   Name Primary?    Effusion of both knee joints     Bilateral primary osteoarthritis of knee     Injury of right knee, initial encounter Yes      Plan:  Right knee MRI  Left knee CSI 40  Although there is not a cure for arthritis, there are effective ways to improve symptoms.     Exercise & Activity:  I recommend low impact activities such as  elliptical and bicycle   Walking is great for arthritis: https://www.[employer]big.[facility]/3-reasons-walking-with-knee-arthritis/  If walking long distances, I recommend good quality well-cushioned shoes. Varsity sports can help you find the right ones: https://www.[employer].[facility]/  Aquatic and pool therapy is often helpful because it lessens the impact on the joint, strengthens the leg and thigh muscles, and helps to control swelling.   Knee motion is important to the health of the knee.     Knee Braces:  A compression knee sleeve can help limit swelling and provide proprioceptive feedback.     Prescriptions & Medications:  I do recommend formal physical therapy or at minimum a home exercise program.   Over the counter analgesic (pain-relieving) medications can help. Examples are Tylenol, Ibuprofen, and Aleve. Check with your primary care physician to make sure you don't have contra-indications to taking those medicines.  Some over the counter supplement solutions such as glucosamine and chondroitin may help with symptoms, although the evidence is mixed.    Healthy Lifestyle:  Excess body weight can have a negative impact on joint health and on pain. I recommend healthy lifestyle choices including nutrition and exercise that help reach and maintain an ideal body weight. Tips for Exercise: https://www.itzbig.[facility]/13-exercise-tips-for-a-healthier-you/  Some diets cause increased inflammation. I recommend a balanced wholesome diet including some foods such as olives that are shown to decrease inflammation. More diet information available here: https://www.itzbig.[facility]/8-best-foods-for-knee-arthritis/    Follow-up: AFTER MRI or sooner if there are any problems between now and then.    Leave Review:   Google: Leave Google Review  Healthgrades: Leave Healthgrades Review    After Hours Number: [phone]       Provider Note/Medical Decision Making:       I discussed worrisome and red flag signs and  symptoms with the patient. The patient expressed understanding and agreed to alert me immediately or to go to the emergency room if they experience any of these.   Treatment plan was developed with input from the patient/family, and they expressed understanding and agreement with the plan. All questions were answered today.          Ed Anand MD  Orthopaedic Surgery & Sports Medicine       Disclaimer: This note was prepared using a voice recognition system and is likely to have sound alike errors within the text.

## 2023-04-20 NOTE — PATIENT INSTRUCTIONS
Assessment:  Daniel Hernandez III is a  27 y.o. male    with a chief complaint of Injury and Pain of the Right Knee and Injury and Pain of the Left Knee    Right knee Injury with buckling event  Possible patella dislocation  Effusion of both knees   Early Osteoarthritis of both knees     Encounter Diagnoses   Name Primary?    Effusion of both knee joints     Bilateral primary osteoarthritis of knee     Injury of right knee, initial encounter Yes      Plan:  Right knee MRI  Left knee CSI 2/2/40  Although there is not a cure for arthritis, there are effective ways to improve symptoms.     Exercise & Activity:  I recommend low impact activities such as elliptical and bicycle   Walking is great for arthritis: https://www.[employer]CRE[employer].com/3-reasons-walking-with-knee-arthritis/  If walking long distances, I recommend good quality well-cushioned shoes. Varsity sports can help you find the right ones: https://www.[employer]/  Aquatic and pool therapy is often helpful because it lessens the impact on the joint, strengthens the leg and thigh muscles, and helps to control swelling.   Knee motion is important to the health of the knee.     Knee Braces:  A compression knee sleeve can help limit swelling and provide proprioceptive feedback.     Prescriptions & Medications:  I do recommend formal physical therapy or at minimum a home exercise program.   Over the counter analgesic (pain-relieving) medications can help. Examples are Tylenol, Ibuprofen, and Aleve. Check with your primary care physician to make sure you don't have contra-indications to taking those medicines.  Some over the counter supplement solutions such as glucosamine and chondroitin may help with symptoms, although the evidence is mixed.    Healthy Lifestyle:  Excess body weight can have a negative impact on joint health and on pain. I recommend healthy lifestyle choices including nutrition and exercise that help reach and maintain an ideal body  weight. Tips for Exercise: https://www.[employer]/13-exercise-tips-for-a-healthier-you/  Some diets cause increased inflammation. I recommend a balanced wholesome diet including some foods such as olives that are shown to decrease inflammation. More diet information available here: https://www.[employer]/8-best-foods-for-knee-arthritis/    Follow-up: AFTER MRI or sooner if there are any problems between now and then.    Leave Review:   Google: Leave Google Review  Healthgrades: Leave Healthgrades Review    After Hours Number: [phone]

## 2023-04-24 ENCOUNTER — TELEPHONE (OUTPATIENT)
Dept: FAMILY MEDICINE | Facility: CLINIC | Age: 28
End: 2023-04-24

## 2023-04-24 ENCOUNTER — PATIENT MESSAGE (OUTPATIENT)
Dept: FAMILY MEDICINE | Facility: CLINIC | Age: 28
End: 2023-04-24
Payer: COMMERCIAL

## 2023-04-24 NOTE — TELEPHONE ENCOUNTER
Good morning Dr Pak,     I am running low on my inhaler that I use when needed when Im experiencing a cold or severe cold. May I get a prescription refill?       Per portal

## 2023-04-28 ENCOUNTER — HOSPITAL ENCOUNTER (OUTPATIENT)
Dept: RADIOLOGY | Facility: HOSPITAL | Age: 28
Discharge: HOME | End: 2023-04-28
Attending: ORTHOPAEDIC SURGERY
Payer: COMMERCIAL

## 2023-04-28 DIAGNOSIS — S89.91XA INJURY OF RIGHT KNEE, INITIAL ENCOUNTER: ICD-10-CM

## 2023-04-28 PROCEDURE — 73721 MRI JNT OF LWR EXTRE W/O DYE: CPT | Mod: 26,RT,, | Performed by: RADIOLOGY

## 2023-04-28 PROCEDURE — 73721 MRI KNEE WITHOUT CONTRAST RIGHT: ICD-10-PCS | Mod: 26,RT,, | Performed by: RADIOLOGY

## 2023-04-28 PROCEDURE — 73721 MRI JNT OF LWR EXTRE W/O DYE: CPT | Mod: TC,RT

## 2023-05-04 ENCOUNTER — OFFICE VISIT (OUTPATIENT)
Dept: SPORTS MEDICINE | Facility: CLINIC | Age: 28
End: 2023-05-04
Payer: COMMERCIAL

## 2023-05-04 DIAGNOSIS — S83.004D DISLOCATION OF RIGHT PATELLA, SUBSEQUENT ENCOUNTER: ICD-10-CM

## 2023-05-04 DIAGNOSIS — E66.01 CLASS 3 SEVERE OBESITY WITH BODY MASS INDEX (BMI) OF 40.0 TO 44.9 IN ADULT, UNSPECIFIED OBESITY TYPE, UNSPECIFIED WHETHER SERIOUS COMORBIDITY PRESENT: ICD-10-CM

## 2023-05-04 DIAGNOSIS — S76.111D RUPTURE OF RIGHT QUADRICEPS TENDON, SUBSEQUENT ENCOUNTER: Primary | ICD-10-CM

## 2023-05-04 PROCEDURE — 99215 OFFICE O/P EST HI 40 MIN: CPT | Mod: S$GLB,,, | Performed by: ORTHOPAEDIC SURGERY

## 2023-05-04 PROCEDURE — 99215 PR OFFICE/OUTPT VISIT, EST, LEVL V, 40-54 MIN: ICD-10-PCS | Mod: S$GLB,,, | Performed by: ORTHOPAEDIC SURGERY

## 2023-05-04 PROCEDURE — 1159F MED LIST DOCD IN RCRD: CPT | Mod: CPTII,S$GLB,, | Performed by: ORTHOPAEDIC SURGERY

## 2023-05-04 PROCEDURE — 1159F PR MEDICATION LIST DOCUMENTED IN MEDICAL RECORD: ICD-10-PCS | Mod: CPTII,S$GLB,, | Performed by: ORTHOPAEDIC SURGERY

## 2023-05-04 PROCEDURE — 99999 PR PBB SHADOW E&M-EST. PATIENT-LVL III: ICD-10-PCS | Mod: PBBFAC,,, | Performed by: ORTHOPAEDIC SURGERY

## 2023-05-04 PROCEDURE — 99999 PR PBB SHADOW E&M-EST. PATIENT-LVL III: CPT | Mod: PBBFAC,,, | Performed by: ORTHOPAEDIC SURGERY

## 2023-05-04 NOTE — PATIENT INSTRUCTIONS
Assessment:  Daniel Hernandez III is a  27 y.o. male    with a chief complaint of Pain of the Right Knee    Right knee partial  Quad tendon tear  Right knee medial retinaculum tear  Early Osteoarthritis of both knees     Encounter Diagnoses   Name Primary?    Rupture of right quadriceps tendon, subsequent encounter Yes    Dislocation of right patella, subsequent encounter       Plan:  Order PT at [facility] - 2-3x per week for 6 weeks   May require surgical intervention in the future but patient wants to try nonop management intitially    Follow-up: 4 weeks or sooner if there are any problems between now and then.    Leave Review:   Google: Leave Google Review  Healthgrades: Leave Healthgrades Review    After Hours Number: [phone]

## 2023-05-04 NOTE — PROGRESS NOTES
Patient ID: Daniel Hernandez III  YOB: 1995  MRN: [id number]    Chief Complaint: Pain of the Right Knee      Referred By: Jese    History of Present Illness: Daniel Hernandez III is a  27 y.o. male    with a chief complaint of Pain of the Right Knee    Daniel is here today for a f/u for R knee pain. He rates his pain as a 0/10 today. He is still feeling knee instability and pain while walking and going upstairs. He is here for an MRI review today.    HPI (4/20/23)  Daniel is here today for bilateral knee pain. He rates his pain as a 1/10. He originally injured both knees on 3/18/2023. He stepped and turned on his R leg and felt a pop. He was assisted to the car and started to walk on L leg where he felt a buckle in his knee. He describes his pain now as dull. He states that it has gotten better. His pain is worsened by stairs and getting up and down from sitting. His pain is alleviated by rest and elevation. He is not in PT. He takes ibuprofen for pain prn.     Past Medical History:   Past Medical History:   Diagnosis Date    Asthma     History of 2019 novel coronavirus disease (COVID-19) 06/2020     Past Surgical History:   Procedure Laterality Date    None       Family History   Problem Relation Age of Onset    No Known Problems Mother     Hypertension Father     Multiple myeloma Sister     Diabetes Neg Hx     Stroke Neg Hx     Heart attack Neg Hx     Thyroid disease Neg Hx     Hyperlipidemia Neg Hx      Social History     Socioeconomic History    Marital status: Single   Occupational History    Occupation:    Tobacco Use    Smoking status: Never    Smokeless tobacco: Never   Substance and Sexual Activity    Alcohol use: Yes    Drug use: Yes     Frequency: 1.0 times per week     Types: Marijuana    Sexual activity: Yes     Partners: Female     Birth control/protection: Condom     Comment: 2 life-time partners     Medication List with Changes/Refills   Current  Medications    ALBUTEROL (ACCUNEB) 0.63 MG/3 ML NEBU    Take 0.63 mg by nebulization every 6 (six) hours as needed. Rescue    ALBUTEROL (PROVENTIL/VENTOLIN HFA) 90 MCG/ACTUATION INHALER    Inhale 2 puffs into the lungs every 6 (six) hours as needed for Wheezing or Shortness of Breath. Rescue    FEXOFENADINE (ALLEGRA) 60 MG TABLET    Take 60 mg by mouth daily as needed.    NAPROXEN (NAPROSYN) 250 MG TABLET    SMARTSI-2 Tablet(s) By Mouth Every 12 Hours PRN     Review of patient's allergies indicates:  No Known Allergies  ROS    Physical Exam:   There is no height or weight on file to calculate BMI.  There were no vitals filed for this visit.   GENERAL: Well appearing, appropriate for stated age, no acute distress.  CARDIOVASCULAR: Pulses regular by peripheral palpation.  PULMONARY: Respirations are even and non-labored.  NEURO: Awake, alert, and oriented x 3.  PSYCH: Mood & affect are appropriate.  HEENT: Head is normocephalic and atraumatic.  Ortho/SPM Exam  ***    Imaging:    MRI Knee Without Contrast Right  Narrative: EXAM: MRI KNEE WITHOUT CONTRAST RIGHT    CLINICAL HISTORY: Chronic right knee pain.    TECHNIQUE: Standard multiplanar pulse sequences obtained without IV or intra-articular contrast.    COMPARISON: None.    FINDINGS: Normal cruciate ligaments.  Mild soft tissue edema superficial to the MCL compatible with a grade 1 sprain.  Lateral collateral complex normal.    High-grade partial tear of the distal quadriceps tendon predominately involving the rectus femoris and vastus medialis component.  Vastus intermedius and vastus lateralis components are intact.  Quadriceps tendon is retracted, beyond the field-of-view.  Associated full-thickness tear of the distal vastus medialis and upper medial patellar retinaculum, with a full-thickness defect measuring 3.9 cm in maximal dimension (series 4 image 8).  Joint fluid extravasates into the medial subcutaneous soft tissues.    Minimal intrasubstance signal  medial and lateral menisci.    Articular cartilage is well preserved within the medial and lateral compartments.    Mild patella cristine with lateral patella tilt and 8 mm lateral patella subluxation.  No trochlea dysplasia.    Bony architecture and marrow signal are normal.  Moderate soft tissue edema anterior medial subcutaneous fat.  Remaining supporting soft tissues and musculature are normal.  Impression: High-grade partial tearing of the quadriceps tendon involving the rectus femoris and vastus medialis components.  Associated 3.9 cm full-thickness tear of the distal vastus medialis and superior medial patellar retinaculum.    Finalized on: 5/1/2023 10:25 AM By:  Ray Lujan MD  [facility]# [id number]      2023-05-01 10:27:16.016    [facility]    ***  Relevant imaging results reviewed and interpreted by me, discussed with the patient and / or family today. ***    Other Tests:     ***    There are no Patient Instructions on file for this visit.  Provider Note/Medical Decision Making: ***      I discussed worrisome and red flag signs and symptoms with the patient. The patient expressed understanding and agreed to alert me immediately or to go to the emergency room if they experience any of these.   Treatment plan was developed with input from the patient/family, and they expressed understanding and agreement with the plan. All questions were answered today.          dE Anand MD  Orthopaedic Surgery & Sports Medicine       Disclaimer: This note was prepared using a voice recognition system and is likely to have sound alike errors within the text.

## 2023-05-04 NOTE — PROGRESS NOTES
Patient ID: Daniel Hernandez III  YOB: 1995  MRN: [id number]    Chief Complaint: Pain of the Right Knee    Referred By: Jese    History of Present Illness: Daniel Hernandez III is a  27 y.o. male    with a chief complaint of Pain of the Right Knee    Daniel is here today for a f/u for R knee pain. He rates his pain as a 0/10 today. He is still feeling knee instability and pain while walking and going upstairs. He is here for an MRI review today.    HPI (4/20/23)  Daniel is here today for bilateral knee pain. He rates his pain as a 1/10. He originally injured both knees on 3/18/2023. He stepped and turned on his R leg and felt a pop. He was assisted to the car and started to walk on L leg where he felt a buckle in his knee. He describes his pain now as dull. He states that it has gotten better. His pain is worsened by stairs and getting up and down from sitting. His pain is alleviated by rest and elevation. He is not in PT. He takes ibuprofen for pain prn.     Past Medical History:   Past Medical History:   Diagnosis Date    Asthma     History of 2019 novel coronavirus disease (COVID-19) 06/2020     Past Surgical History:   Procedure Laterality Date    None       Family History   Problem Relation Age of Onset    No Known Problems Mother     Hypertension Father     Multiple myeloma Sister     Diabetes Neg Hx     Stroke Neg Hx     Heart attack Neg Hx     Thyroid disease Neg Hx     Hyperlipidemia Neg Hx      Social History     Socioeconomic History    Marital status: Single   Occupational History    Occupation:    Tobacco Use    Smoking status: Never    Smokeless tobacco: Never   Substance and Sexual Activity    Alcohol use: Yes    Drug use: Yes     Frequency: 1.0 times per week     Types: Marijuana    Sexual activity: Yes     Partners: Female     Birth control/protection: Condom     Comment: 2 life-time partners     Medication List with Changes/Refills   Current  Medications    ALBUTEROL (ACCUNEB) 0.63 MG/3 ML NEBU    Take 0.63 mg by nebulization every 6 (six) hours as needed. Rescue    ALBUTEROL (PROVENTIL/VENTOLIN HFA) 90 MCG/ACTUATION INHALER    Inhale 2 puffs into the lungs every 6 (six) hours as needed for Wheezing or Shortness of Breath. Rescue    FEXOFENADINE (ALLEGRA) 60 MG TABLET    Take 60 mg by mouth daily as needed.    NAPROXEN (NAPROSYN) 250 MG TABLET    SMARTSI-2 Tablet(s) By Mouth Every 12 Hours PRN     Review of patient's allergies indicates:  No Known Allergies  ROS    Physical Exam:   There is no height or weight on file to calculate BMI.  There were no vitals filed for this visit.   GENERAL: Well appearing, appropriate for stated age, no acute distress.  CARDIOVASCULAR: Pulses regular by peripheral palpation.  PULMONARY: Respirations are even and non-labored.  NEURO: Awake, alert, and oriented x 3.  PSYCH: Mood & affect are appropriate.  HEENT: Head is normocephalic and atraumatic.            Right Knee Exam     Inspection   Effusion: present    Tenderness   The patient is tender to palpation of the medial retinaculum (quad tendon).    Range of Motion   Extension:  0 (no extension lag)   Flexion:  110     Other   Sensation: normal    Comments:  Intact EHL, FHL, gastrocsoleus, and tibialis anterior. Sensation intact to light touch in superficial peroneal, deep peroneal, tibial, sural, and saphenous nerve distributions. Foot warm and well perfused with capillary refill of less than 2 seconds and palpable pedal pulses.      Muscle Strength   Right Lower Extremity   Hip Abduction: 5/5   Quadriceps:  5/5   Hamstrin/5     Vascular Exam     Right Pulses  Dorsalis Pedis:      2+  Posterior Tibial:      2+        Imaging:    MRI Knee Without Contrast Right  Narrative: EXAM: MRI KNEE WITHOUT CONTRAST RIGHT    CLINICAL HISTORY: Chronic right knee pain.    TECHNIQUE: Standard multiplanar pulse sequences obtained without IV or intra-articular  contrast.    COMPARISON: None.    FINDINGS: Normal cruciate ligaments.  Mild soft tissue edema superficial to the MCL compatible with a grade 1 sprain.  Lateral collateral complex normal.    High-grade partial tear of the distal quadriceps tendon predominately involving the rectus femoris and vastus medialis component.  Vastus intermedius and vastus lateralis components are intact.  Quadriceps tendon is retracted, beyond the field-of-view.  Associated full-thickness tear of the distal vastus medialis and upper medial patellar retinaculum, with a full-thickness defect measuring 3.9 cm in maximal dimension (series 4 image 8).  Joint fluid extravasates into the medial subcutaneous soft tissues.    Minimal intrasubstance signal medial and lateral menisci.    Articular cartilage is well preserved within the medial and lateral compartments.    Mild patella cristine with lateral patella tilt and 8 mm lateral patella subluxation.  No trochlea dysplasia.    Bony architecture and marrow signal are normal.  Moderate soft tissue edema anterior medial subcutaneous fat.  Remaining supporting soft tissues and musculature are normal.  Impression: High-grade partial tearing of the quadriceps tendon involving the rectus femoris and vastus medialis components.  Associated 3.9 cm full-thickness tear of the distal vastus medialis and superior medial patellar retinaculum.    Finalized on: 5/1/2023 10:25 AM By:  Ray Lujan MD  [facility]# [id number]      2023-05-01 10:27:16.016    [facility]      Relevant imaging results reviewed and interpreted by me, discussed with the patient and / or family today.     Other Tests:         Patient Instructions   Assessment:  Daniel Hernandez III is a  27 y.o. male    with a chief complaint of Pain of the Right Knee    Right knee partial  Quad tendon tear  Right knee medial retinaculum tear  Early Osteoarthritis of both knees     Encounter Diagnoses   Name Primary?    Rupture of right quadriceps tendon,  subsequent encounter Yes    Dislocation of right patella, subsequent encounter       Plan:  Order PT at [place] - 2-3x per week for 6 weeks   May require surgical intervention in the future but patient wants to try nonop management intitially    Follow-up: 4 weeks or sooner if there are any problems between now and then.    Leave Review:   Google: Leave Google Review  Healthgrades: Leave Healthgrades Review    After Hours Number: [phone]       Provider Note/Medical Decision Making: large high grade full thickness tear, but extensor mechanism intact. This would be a major surgery with his obesity being a risk factor for being able to follow limited weight bearing restrictions. However, not addressing could lead to major issues as well. He would prefer to trial nonop because he feels happy with his function currently, understands implications of future knee condition. Failure of extensor mechanicsm would cause risk to his daily function and ability to walk.      I discussed worrisome and red flag signs and symptoms with the patient. The patient expressed understanding and agreed to alert me immediately or to go to the emergency room if they experience any of these.   Treatment plan was developed with input from the patient/family, and they expressed understanding and agreement with the plan. All questions were answered today.          Ed Anand MD  Orthopaedic Surgery & Sports Medicine       Disclaimer: This note was prepared using a voice recognition system and is likely to have sound alike errors within the text.     I, Mag Cummings, acted as a scribe for Ed Anand MD for the duration of this office visit.

## 2023-05-11 ENCOUNTER — CLINICAL SUPPORT (OUTPATIENT)
Dept: REHABILITATION | Facility: HOSPITAL | Age: 28
End: 2023-05-11
Attending: ORTHOPAEDIC SURGERY
Payer: COMMERCIAL

## 2023-05-11 DIAGNOSIS — M62.81 PROXIMAL MUSCLE WEAKNESS: ICD-10-CM

## 2023-05-11 DIAGNOSIS — R29.898 WEAKNESS OF LOWER EXTREMITY, UNSPECIFIED LATERALITY: ICD-10-CM

## 2023-05-11 DIAGNOSIS — S76.111D RUPTURE OF RIGHT QUADRICEPS TENDON, SUBSEQUENT ENCOUNTER: ICD-10-CM

## 2023-05-11 DIAGNOSIS — Z74.09 DECREASED FUNCTIONAL MOBILITY AND ENDURANCE: ICD-10-CM

## 2023-05-11 DIAGNOSIS — R26.89 POOR BALANCE: ICD-10-CM

## 2023-05-11 PROCEDURE — 97162 PT EVAL MOD COMPLEX 30 MIN: CPT

## 2023-05-11 PROCEDURE — 97110 THERAPEUTIC EXERCISES: CPT

## 2023-05-11 NOTE — PLAN OF CARE
[facility]   Physical Therapy Initial Evaluation      Date: 5/11/2023   Name: Daniel Hernandez III  Clinic Number: [id number]    Therapy Diagnosis:    Encounter Diagnoses   Name Primary?    Rupture of right quadriceps tendon, subsequent encounter     Decreased functional mobility and endurance     Proximal muscle weakness     Poor balance       Physician: Ed Anand MD     Physician Orders: PT Eval and Treat  Medical Diagnosis from Referral: Rupture of right quadriceps tendon, subsequent encounter [S76.111D]  Evaluation Date: 5/11/2023  Authorization Period Expiration: 12/29/2023  Plan of Care Expiration: 8/11/2023  Progress Note Due: 6/11/2023  Visit # / Visits authorized: 1/1   FOTO: 1/3 (last performed on 5/11/2023)    Precautions: Standard    Time In: 1406  Time Out: 1500  Total Billable Time (timed & untimed codes): 50 minutes    Subjective     Date of onset: 3/18/2023    History of current condition -  Mechanism of Injury: pt was stepping and turning quickly on the leg and felt a pop; stumbled but didn't fall at the time. Friends helped him to a car and tried to move on the left leg but it gave out and he fell. Fell the next day on the knee and then three days later because both knees would give. Medial knee pain on the left side with open chain knee extension only but no other issues. Biggest fear is getting on and off the ground. Buckling has subsided greatly but continues to still happen occasionally   Worsening/Better/Same: better  Concomitant Injuries:  level 2 lateral ankle sprain in Fall 2021 but no current issues   Exercise Regimen: since injury he has been walking up to a mile, quadricep stretch, squats, LAQ, SLR flexion, abduction and adduction, push outs into a band. Swimming freestyle (50 meters now but up to 1500 meters before injury)         Imaging: [] Xray [x] MRI [] CT: Performed on: 4/28/2023  High-grade partial tear of the distal quadriceps tendon  predominately involving the rectus femoris and vastus medialis component.  Vastus intermedius and vastus lateralis components are intact.  Quadriceps tendon is retracted, beyond the field-of-view.  Associated full-thickness tear of the distal vastus medialis and upper medial patellar retinaculum, with a full-thickness defect measuring 3.9 cm in maximal dimension (series 4 image 8).  Joint fluid extravasates into the medial subcutaneous soft tissues.    Pain:  Current 0/10, worst 3/10, best 0/10   Location: [x] Right   [] Left:  quadricep tendon  Description: sharp, pulling, swelling   Aggravating Factors: standing after prolonged sitting, stairs, driving (due to knees being locked out)   Easing Factors: activity avoidance, rest, ibuprofen prn     Prior Therapy:   [x] N/A    [] Yes:   Social History: Pt lives alone  Occupation: Pt is a  (on his feet frequently throughout the day, difficulty getting up and down)   Prior Level of Function: Independent and pain free with all ADL, IADL, community mobility and functional activities.   Current Level of Function: difficulty with functional movements such as stairs, squatting and getting on and off the floor     Dominant Extremity:    [x] Right    [] Left    Pts goals: Pt reported goals are to decrease overall pain levels in order to return to prior functional level. Wants to be able to climb stairs, get up from a chair, and be able to get on and off the ground     Medical History:   Past Medical History:   Diagnosis Date    Asthma     History of 2019 novel coronavirus disease (COVID-19) 06/2020       Surgical History:   Daniel Hernandez III  has a past surgical history that includes None.    Medications:   Daniel has a current medication list which includes the following prescription(s): albuterol, albuterol, fexofenadine, and naproxen.    Allergies:   Review of patient's allergies indicates:  No Known Allergies     Objective        RANGE OF MOTION:    Knee AROM/PROM Right Left Pain/Dysfunction with Movement Goal   Knee Flexion (135º) 120 120     Knee Extension (0º) 0 0            STRENGTH:   L/E MMT Right  (spine) Left Pain/Dysfunction with Movement Goal   Modified (90/90) Abdominal Strength  Poor  ---     Hip Flexion  4/5 4/5  4+/5 B   Hip Extension  4/5 4/5  4+/5 B   Hip Abduction  4/5 4/5  4+/5 B   Knee Extension 4/5 4+/5  5/5 B   Knee Flexion 4/5 4+/5  5/5 B   Hip IR 4/5 4/5  4+/5 B   Hip ER 4/5 4+/5  4+/5 B   Ankle DF 5/5 5/5  5/5 B   Ankle PF 5/5 5/5 Minimal vertical displacement with calf raises  5/5 B                      SENSATION  [x] Intact to Light Touch   [] Impaired:            POSTURE:  Pt presents with postural abnormalities which include:    [x] Forward Head   [] Increased Lumbar Lordosis   [x] Rounded Shoulder   [] Genu Recurvatum   [x] Increased Thoracic Kyphosis [] Genu Valgus   [] Trunk Deviated    [x] Pes Planus   [] Scapular Winging    [] Other:         FUNCTIONAL MOVEMENT PATTERNS  Movement Analysis Notes   Sit to Stand []Functional  [x]Dysfunctional:  [x]Painful  []Non-Painful    Mild knee pain B    Squat []Functional  [x]Dysfunctional:  [x]Painful  []Non-Painful    Mild knee pain B    Single Leg Squat  []Functional  []Dysfunctional:  []Painful  []Non-Painful       Step Down  []Functional  []Dysfunctional:  []Painful  []Non-Painful       Calf raises  []Functional  [x]Dysfunctional:  [x]Painful  []Non-Painful    Bend knee, poor vertical height, feels like he is going to buckle         SLS 26s on L; 7s on R and compensated trendelenburg    Function:     CMS Impairment/Limitation/Restriction for FOTO NT Survey    Therapist reviewed FOTO scores for Daniel on 5/11/2023.   FOTO documents entered into [employer] - see Media section.    Limitation Score: NT%         Treatment     Total Treatment time (time-based codes) separate from Evaluation: (15) minutes     Daniel received the treatments listed below:        THERAPEUTIC EXERCISES to develop  strength, endurance, ROM, flexibility, posture, and core stabilization for (15) minutes including:    Performed Today:     Bridges 3x10   Straight leg raise abduction 2x10 B   Single leg stance with kickstand 2x30s B  Interventions DEFERRED today                Patient Education and Home Exercises     Education provided: (time included with therex) minutes  PURPOSE: Patient educated on the impairments noted above and the effects of physical therapy intervention to improve overall condition and QOL.   EXERCISE: Patient was educated on all the above exercise prior/during/after for proper posture, positioning, and execution for safe performance with home exercise program.   STRENGTH: Patient educated on the importance of improved core and extremity strength in order to improve alignment of the spine and extremities with static positions and dynamic movement.   GAIT & BALANCE: Patient educated on the importance of strong core and lower extremity musculature in order to improve both static and dynamic balance, improve gait mechanics, reduce fall risk and improve household and community mobility.     Written Home Exercises Provided: yes.  Exercises were reviewed and Daniel was able to demonstrate them prior to the end of the session.  Daniel demonstrated good  understanding of the education provided. See EMR under Patient Instructions for exercises provided during therapy sessions.    Assessment     Daniel is a 27 y.o. male referred to outpatient Physical Therapy with a medical diagnosis of Rupture of right quadriceps tendon. Pt presents with impairments in the following categories: IMPAIRMENTS: strength, endurance, balance, core strength and stability, and functional movement patterns    Pt prognosis is Excellent  Pt will benefit from skilled outpatient Physical Therapy to address the deficits stated above and in the chart below, provide pt/family education, and to maximize pt's level of independence.     Plan of care  discussed with patient: Yes  Pt's spiritual, cultural and educational needs considered and patient is agreeable to the plan of care and goals as stated below:     Anticipated Barriers for therapy: co-morbidities    Medical Necessity is demonstrated by the following  History  Co-morbidities and personal factors that may impact the plan of care [] LOW: no personal factors / co-morbidities  [] MODERATE: 1-2 personal factors / co-morbidities  [x] HIGH: 3+ personal factors / co-morbidities    Moderate / High Support Documentation: high BMI, asthma, occupation     Examination  Body Structures and Functions, activity limitations and participation restrictions that may impact the plan of care [] LOW: addressing 1-2 elements  [] MODERATE: 3+ elements  [x] HIGH: 4+ elements (please support below)    Moderate / High Support Documentation: decreased strength, decreased balance. Impaired mobility with squats, sit to stand, lifting and prolonged walking      Clinical Presentation [] LOW: stable  [x] MODERATE: Evolving  [] HIGH: Unstable     Decision Making/ Complexity Score: moderate         Short Term Goals:  6 weeks Status  Date Met   PAIN: Pt will report worst pain of 1/10 in order to progress toward max functional ability and improve quality of life. [x] Progressing  [] Met  [] Not Met    FUNCTION: Patient will demonstrate improved function as indicated by a functional limitation score of less than or equal to NT out of 100 on FOTO. [x] Progressing  [] Met  [] Not Met    STRENGTH: Patient will improve strength to 50% of stated goals, listed in objective measures above, in order to progress towards independence with functional activities. [x] Progressing  [] Met  [] Not Met    HEP: Patient will demonstrate independence with HEP in order to progress toward functional independence. [x] Progressing  [] Met  [] Not Met      Long Term Goals:  12 weeks Status Date Met   PAIN: Pt will report worst pain of 0/10 in order to progress  toward max functional ability and improve quality of life [x] Progressing  [] Met  [] Not Met    FUNCTION: Patient will demonstrate improved function as indicated by a functional limitation score of less than or equal to NT out of 100 on FOTO. [x] Progressing  [] Met  [] Not Met    STRENGTH: Patient will improve strength to stated goals, listed in objective measures above, in order to improve functional independence and quality of life.  [x] Progressing  [] Met  [] Not Met    Patient will demonstrate ability to get on and off of the floor with good form in order to improve functional mobility  [x] Progressing  [] Met  [] Not Met      Plan     Plan of care Certification: 5/11/2023 to 8/11/2023.    Outpatient Physical Therapy 2 times weekly for 12 weeks to include any combination of the following interventions: virtual visits, dry needling, modalities, electrical stimulation (IFC, Pre-Mod, Attended with Functional Dry Needling), Cervical/Lumbar Traction, Gait Training, Manual Therapy, Neuromuscular Re-ed, Patient Education, Self Care, Therapeutic Exercise, and Therapeutic Activites     Awilda Logan, PT, DPT

## 2023-05-12 ENCOUNTER — CLINICAL SUPPORT (OUTPATIENT)
Dept: REHABILITATION | Facility: HOSPITAL | Age: 28
End: 2023-05-12
Payer: COMMERCIAL

## 2023-05-12 DIAGNOSIS — M62.81 PROXIMAL MUSCLE WEAKNESS: ICD-10-CM

## 2023-05-12 DIAGNOSIS — R26.89 POOR BALANCE: ICD-10-CM

## 2023-05-12 DIAGNOSIS — Z74.09 DECREASED FUNCTIONAL MOBILITY AND ENDURANCE: Primary | ICD-10-CM

## 2023-05-12 DIAGNOSIS — R29.898 WEAKNESS OF LOWER EXTREMITY, UNSPECIFIED LATERALITY: ICD-10-CM

## 2023-05-12 PROCEDURE — 97530 THERAPEUTIC ACTIVITIES: CPT

## 2023-05-12 PROCEDURE — 97112 NEUROMUSCULAR REEDUCATION: CPT

## 2023-05-12 PROCEDURE — 97110 THERAPEUTIC EXERCISES: CPT

## 2023-05-12 NOTE — PROGRESS NOTES
[facility]   Physical Therapy Treatment Note        Name: Daniel Hernandez III  Clinic Number: [id number]    Therapy Diagnosis:   Encounter Diagnoses   Name Primary?    Decreased functional mobility and endurance Yes    Proximal muscle weakness     Poor balance     Weakness of lower extremity, unspecified laterality      Physician: Ed Anand MD    Visit Date: 5/12/2023    Physician Orders: PT Eval and Treat  Medical Diagnosis from Referral: Rupture of right quadriceps tendon, subsequent encounter [S76.111D]  Evaluation Date: 5/11/2023  Authorization Period Expiration: 12/29/2023  Plan of Care Expiration: 8/11/2023  Progress Note Due: 6/11/2023  Visit # / Visits authorized: 1/25 (+1 for evaluation)   FOTO: 1/3 (last performed on 5/11/2023)     Precautions: Standard    Time In: 1030  Time Out: 1135  Total Billable Time: 51 minutes (Billing reflects 1 on 1 treatment time spent with patient)    Subjective     Patient reports: he is feeling good with no significant changes since initial eval.    He/She was compliant with home exercise program.  Response to previous treatment: felt fine with no significant changes   Functional change: none noted at this time    Pain: 0/10     Location: R knee    Objective      Objective Measures updated at progress report or POC update only unless otherwise noted.       Treatment     Daniel received the treatments listed below:       MANUAL THERAPY TECHNIQUES were applied for (0) minutes, including:    Soft tissue mobilization:     Joint mobilizations:     Functional dry needling: DEFERRED        THERAPEUTIC EXERCISES to develop strength, endurance, ROM, flexibility, posture, and core stabilization for (16) minutes including:    Performed Today:     Upright bike level 5 for 5 minutes   Side stepping GREEN 3 laps     Retro-walking on treadmill: 5 minutes progressed to pushing belt  Interventions DEFERRED today     Straight leg raise abduction               NEUROMUSCULAR RE-EDUCATION ACTIVITIES to improve Balance, Coordination, Kinesthetic, Sense, Proprioception, and Posture for (10) minutes.  The following were included:    Performed Today:     Single leg rebounder toss with kickstand 3x10 B   Tandem walking 8 laps forward and backward  Interventions DEFERRED today                  THERAPEUTIC ACTIVITIES to improve dynamic and functional  performance for (25) minutes including:    Performed Today:     Squats to box 24 inch 2x10   TRX squats 2x10   Calf raises on stairs 3x10   Shuttle leg press   Double le bands 3x10   Single le bands 3x10 B  Interventions DEFERRED today     Bridges              Next Session:  Knee extensions   Hamstring curls        Patient Education and Home Exercises       Home Exercises Provided and Patient Education Provided     Education provided: (time included with therex) minutes  PURPOSE: Patient educated on the impairments noted above and the effects of physical therapy intervention to improve overall condition and QOL.   EXERCISE: Patient was educated on all the above exercise prior/during/after for proper posture, positioning, and execution for safe performance with home exercise program.   STRENGTH: Patient educated on the importance of improved core and extremity strength in order to improve alignment of the spine and extremities with static positions and dynamic movement.   GAIT & BALANCE: Patient educated on the importance of strong core and lower extremity musculature in order to improve both static and dynamic balance, improve gait mechanics, reduce fall risk and improve household and community mobility.     Written Home Exercises Provided: yes.  Exercises were reviewed and Daniel was able to demonstrate them prior to the end of the session.  Daniel demonstrated good  understanding of the education provided. See EMR under Patient Instructions for exercises provided during therapy sessions.    Assessment     Pt  tolerated session well today; significant progressions made with no adverse reactions reported by pt.     Response to Manual Therapy: N/A   Response to Therapeutic Exercise: added side stepping with band to improve lateral hip strength   Response to Neuromuscular Re-education: added single leg stance with rebounder toss to further challenge single leg stability  Response to Therapeutic Activity: added box squats and TRX squats to improve functional mobility; cueing required initially to prevent weight shift     Daniel is progressing well towards his goals.   Pt prognosis is Excellent.     Pt will continue to benefit from skilled outpatient physical therapy to address the deficits listed in the problem list box on initial evaluation, provide pt/family education and to maximize pt's level of independence in the home and community environment.     Pt's spiritual, cultural and educational needs considered and pt agreeable to plan of care and goals.     Anticipated Barriers for therapy: co-morbidities    Short Term Goals:  6 weeks Status  Date Met   PAIN: Pt will report worst pain of 1/10 in order to progress toward max functional ability and improve quality of life. [x] Progressing  [] Met  [] Not Met     FUNCTION: Patient will demonstrate improved function as indicated by a functional limitation score of less than or equal to NT out of 100 on FOTO. [x] Progressing  [] Met  [] Not Met     STRENGTH: Patient will improve strength to 50% of stated goals, listed in objective measures above, in order to progress towards independence with functional activities. [x] Progressing  [] Met  [] Not Met     HEP: Patient will demonstrate independence with HEP in order to progress toward functional independence. [x] Progressing  [] Met  [] Not Met        Long Term Goals:  12 weeks Status Date Met   PAIN: Pt will report worst pain of 0/10 in order to progress toward max functional ability and improve quality of life [x] Progressing  []  Met  [] Not Met     FUNCTION: Patient will demonstrate improved function as indicated by a functional limitation score of less than or equal to NT out of 100 on FOTO. [x] Progressing  [] Met  [] Not Met     STRENGTH: Patient will improve strength to stated goals, listed in objective measures above, in order to improve functional independence and quality of life.  [x] Progressing  [] Met  [] Not Met     Patient will demonstrate ability to get on and off of the floor with good form in order to improve functional mobility  [x] Progressing  [] Met  [] Not Met         Plan     Continue Plan of Care (POC) and progress per patient tolerance. See treatment section for details on planned progressions next session.      Awilda Logan, PT

## 2023-05-31 ENCOUNTER — CLINICAL SUPPORT (OUTPATIENT)
Dept: REHABILITATION | Facility: HOSPITAL | Age: 28
End: 2023-05-31
Payer: COMMERCIAL

## 2023-05-31 DIAGNOSIS — R26.89 POOR BALANCE: ICD-10-CM

## 2023-05-31 DIAGNOSIS — Z74.09 DECREASED FUNCTIONAL MOBILITY AND ENDURANCE: Primary | ICD-10-CM

## 2023-05-31 DIAGNOSIS — M62.81 PROXIMAL MUSCLE WEAKNESS: ICD-10-CM

## 2023-05-31 DIAGNOSIS — R29.898 WEAKNESS OF LOWER EXTREMITY, UNSPECIFIED LATERALITY: ICD-10-CM

## 2023-05-31 PROCEDURE — 97530 THERAPEUTIC ACTIVITIES: CPT

## 2023-05-31 PROCEDURE — 97110 THERAPEUTIC EXERCISES: CPT

## 2023-05-31 PROCEDURE — 97112 NEUROMUSCULAR REEDUCATION: CPT

## 2023-05-31 NOTE — PROGRESS NOTES
[facility]   Physical Therapy Treatment Note        Name: Daniel Hernandez III  Clinic Number: [id number]    Therapy Diagnosis:   Encounter Diagnoses   Name Primary?    Decreased functional mobility and endurance Yes    Proximal muscle weakness     Poor balance     Weakness of lower extremity, unspecified laterality        Physician: Ed Anand MD    Visit Date: 5/31/2023    Physician Orders: PT Eval and Treat  Medical Diagnosis from Referral: Rupture of right quadriceps tendon, subsequent encounter [S76.111D]  Evaluation Date: 5/11/2023  Authorization Period Expiration: 12/29/2023  Plan of Care Expiration: 8/11/2023  Progress Note Due: 6/11/2023  Visit # / Visits authorized: 2/25 (+1 for evaluation)   FOTO: 1/3 (last performed on 5/11/2023)     Precautions: Standard    Time In: 1500  Time Out: 1559  Total Billable Time: 52 minutes (Billing reflects 1 on 1 treatment time spent with patient)    Subjective     Patient reports: he is feeling good. He has been working out while on his work trip and has noticed improvement in B knee pain, as well as R quad strength and stability     He/She was compliant with home exercise program.  Response to previous treatment: felt fine with no significant changes   Functional change: none noted at this time    Pain: 0/10     Location: R knee    Objective      Objective Measures updated at progress report or POC update only unless otherwise noted.       Treatment     Daniel received the treatments listed below:       MANUAL THERAPY TECHNIQUES were applied for (0) minutes, including:    Soft tissue mobilization:     Joint mobilizations:     Functional dry needling: DEFERRED        THERAPEUTIC EXERCISES to develop strength, endurance, ROM, flexibility, posture, and core stabilization for (14) minutes including:    Performed Today:     Upright bike level 5 for 5 minutes     Foam rolling quadriceps 2 minutes     Lateral squat/monster walks GREEN 3 laps  each     Interventions DEFERRED today     Straight leg raise abduction   Retro-walking on treadmill: 5 minutes progressed to pushing belt            NEUROMUSCULAR RE-EDUCATION ACTIVITIES to improve Balance, Coordination, Kinesthetic, Sense, Proprioception, and Posture for (8) minutes.  The following were included:    Performed Today:     Single leg stance 3x30s on R   TKE: purple cord 3x10  Interventions DEFERRED today     Single leg rebounder toss with kickstand 3x10 B   Tandem walking 8 laps forward and backward              THERAPEUTIC ACTIVITIES to improve dynamic and functional  performance for (30) minutes including:    Performed Today:     Squats to box 20 inch 2x10   Calf raises on stairs 3x10     Knee extensions   SL 25# 3x10 B     Hamstring curls   SL: 45# 3x10 B     Shuttle leg press   Double le bands 3x10   Single le bands 3x10 B    Interventions DEFERRED today     Bridges   TRX squats 2x10            Next Session:  Side plank   Table top       Patient Education and Home Exercises       Home Exercises Provided and Patient Education Provided     Education provided: (time included with therex) minutes  PURPOSE: Patient educated on the impairments noted above and the effects of physical therapy intervention to improve overall condition and QOL.   EXERCISE: Patient was educated on all the above exercise prior/during/after for proper posture, positioning, and execution for safe performance with home exercise program.   STRENGTH: Patient educated on the importance of improved core and extremity strength in order to improve alignment of the spine and extremities with static positions and dynamic movement.   GAIT & BALANCE: Patient educated on the importance of strong core and lower extremity musculature in order to improve both static and dynamic balance, improve gait mechanics, reduce fall risk and improve household and community mobility.     Written Home Exercises Provided: yes.  Exercises were  reviewed and Daniel was able to demonstrate them prior to the end of the session.  Daniel demonstrated good  understanding of the education provided. See EMR under Patient Instructions for exercises provided during therapy sessions.    Assessment     Pt tolerated session well today; significant progressions made with no adverse reactions reported by pt.     Response to Manual Therapy: N/A   Response to Therapeutic Exercise: added foam rolling to manage quadriceps muscle tension and soreness which pt reports following working out over the last several weeks   Response to Neuromuscular Re-education: added TKE's to improve quadriceps recruitment with terminal extension in stance   Response to Therapeutic Activity: progressed box squats to 20 inch box with cueing required to reduce weight shift initially. Incorporated knee extensions and hamstring curls with reports of fatigue and minimal symptoms     Daniel is progressing well towards his goals.   Pt prognosis is Excellent.     Pt will continue to benefit from skilled outpatient physical therapy to address the deficits listed in the problem list box on initial evaluation, provide pt/family education and to maximize pt's level of independence in the home and community environment.     Pt's spiritual, cultural and educational needs considered and pt agreeable to plan of care and goals.     Anticipated Barriers for therapy: co-morbidities    Short Term Goals:  6 weeks Status  Date Met   PAIN: Pt will report worst pain of 1/10 in order to progress toward max functional ability and improve quality of life. [x] Progressing  [] Met  [] Not Met     FUNCTION: Patient will demonstrate improved function as indicated by a functional limitation score of less than or equal to NT out of 100 on FOTO. [x] Progressing  [] Met  [] Not Met     STRENGTH: Patient will improve strength to 50% of stated goals, listed in objective measures above, in order to progress towards independence with  functional activities. [x] Progressing  [] Met  [] Not Met     HEP: Patient will demonstrate independence with HEP in order to progress toward functional independence. [x] Progressing  [] Met  [] Not Met        Long Term Goals:  12 weeks Status Date Met   PAIN: Pt will report worst pain of 0/10 in order to progress toward max functional ability and improve quality of life [x] Progressing  [] Met  [] Not Met     FUNCTION: Patient will demonstrate improved function as indicated by a functional limitation score of less than or equal to NT out of 100 on FOTO. [x] Progressing  [] Met  [] Not Met     STRENGTH: Patient will improve strength to stated goals, listed in objective measures above, in order to improve functional independence and quality of life.  [x] Progressing  [] Met  [] Not Met     Patient will demonstrate ability to get on and off of the floor with good form in order to improve functional mobility  [x] Progressing  [] Met  [] Not Met         Plan     Continue Plan of Care (POC) and progress per patient tolerance. See treatment section for details on planned progressions next session.      Awilda Logan, PT

## 2023-06-01 ENCOUNTER — OFFICE VISIT (OUTPATIENT)
Dept: SPORTS MEDICINE | Facility: CLINIC | Age: 28
End: 2023-06-01
Payer: COMMERCIAL

## 2023-06-01 VITALS — BODY MASS INDEX: 40.43 KG/M2 | HEIGHT: 74 IN | WEIGHT: 315 LBS

## 2023-06-01 DIAGNOSIS — S83.004D DISLOCATION OF RIGHT PATELLA, SUBSEQUENT ENCOUNTER: ICD-10-CM

## 2023-06-01 DIAGNOSIS — S76.111D RUPTURE OF RIGHT QUADRICEPS TENDON, SUBSEQUENT ENCOUNTER: Primary | ICD-10-CM

## 2023-06-01 DIAGNOSIS — E66.01 CLASS 3 SEVERE OBESITY WITH BODY MASS INDEX (BMI) OF 40.0 TO 44.9 IN ADULT, UNSPECIFIED OBESITY TYPE, UNSPECIFIED WHETHER SERIOUS COMORBIDITY PRESENT: ICD-10-CM

## 2023-06-01 PROCEDURE — 99999 PR PBB SHADOW E&M-EST. PATIENT-LVL III: ICD-10-PCS | Mod: PBBFAC,,, | Performed by: ORTHOPAEDIC SURGERY

## 2023-06-01 PROCEDURE — 3008F PR BODY MASS INDEX (BMI) DOCUMENTED: ICD-10-PCS | Mod: CPTII,S$GLB,, | Performed by: ORTHOPAEDIC SURGERY

## 2023-06-01 PROCEDURE — 1159F PR MEDICATION LIST DOCUMENTED IN MEDICAL RECORD: ICD-10-PCS | Mod: CPTII,S$GLB,, | Performed by: ORTHOPAEDIC SURGERY

## 2023-06-01 PROCEDURE — 99213 PR OFFICE/OUTPT VISIT, EST, LEVL III, 20-29 MIN: ICD-10-PCS | Mod: S$GLB,,, | Performed by: ORTHOPAEDIC SURGERY

## 2023-06-01 PROCEDURE — 99213 OFFICE O/P EST LOW 20 MIN: CPT | Mod: S$GLB,,, | Performed by: ORTHOPAEDIC SURGERY

## 2023-06-01 PROCEDURE — 1159F MED LIST DOCD IN RCRD: CPT | Mod: CPTII,S$GLB,, | Performed by: ORTHOPAEDIC SURGERY

## 2023-06-01 PROCEDURE — 99999 PR PBB SHADOW E&M-EST. PATIENT-LVL III: CPT | Mod: PBBFAC,,, | Performed by: ORTHOPAEDIC SURGERY

## 2023-06-01 PROCEDURE — 3008F BODY MASS INDEX DOCD: CPT | Mod: CPTII,S$GLB,, | Performed by: ORTHOPAEDIC SURGERY

## 2023-06-01 NOTE — PATIENT INSTRUCTIONS
Assessment:  Daniel Hernandez III is a  27 y.o. male    with a chief complaint of Pain and Injury of the Right Knee    Right knee partial  Quad tendon tear  Decreased strength with knee extension  Right knee medial retinaculum tear  Early Osteoarthritis of both knees     Encounter Diagnoses   Name Primary?    Rupture of right quadriceps tendon, subsequent encounter Yes    Dislocation of right patella, subsequent encounter     Class 3 severe obesity with body mass index (BMI) of 40.0 to 44.9 in adult, unspecified obesity type, unspecified whether serious comorbidity present         Plan:  Continue with nonoperative management of physical therapy  May require surgical intervention in the future but patient wants to continue with non op    Follow-up: as needed or sooner if there are any problems between now and then.    Leave Review:   Google: Leave Google Review  Healthgrades: Leave Healthgrades Review    After Hours Number: [phone]

## 2023-06-01 NOTE — PROGRESS NOTES
Patient ID: Daniel Hernandez III  YOB: 1995  MRN: [id number]    Chief Complaint: Pain and Injury of the Right Knee    Referred By: Jese    History of Present Illness: Daniel Hernandez III is a  27 y.o. male    with a chief complaint of Pain and Injury of the Right Knee    He is here today for follow up of right knee pain. He reports that he still has swelling that has not been as constant. His pain is aggravated by prolonged sitting/driving. He reports intermittent instability of his right knee, but feels as though it is related to fatigue after physical therapy or increases in activity. He is participating in physical therapy at the [place] with Awilda and feels as though his strength and function is improving. He reports use of ibuprofen as needed for pain. He is not currently using a knee brace.     HPI 5/4/23  Daniel is here today for a f/u for R knee pain. He rates his pain as a 0/10 today. He is still feeling knee instability and pain while walking and going upstairs. He is here for an MRI review today.    Pain    Injury   (4/20/23)  Daniel is here today for bilateral knee pain. He rates his pain as a 1/10. He originally injured both knees on 3/18/2023. He stepped and turned on his R leg and felt a pop. He was assisted to the car and started to walk on L leg where he felt a buckle in his knee. He describes his pain now as dull. He states that it has gotten better. His pain is worsened by stairs and getting up and down from sitting. His pain is alleviated by rest and elevation. He is not in PT. He takes ibuprofen for pain prn.     Past Medical History:   Past Medical History:   Diagnosis Date    Asthma     History of 2019 novel coronavirus disease (COVID-19) 06/2020     Past Surgical History:   Procedure Laterality Date    None       Family History   Problem Relation Age of Onset    No Known Problems Mother     Hypertension Father     Multiple myeloma Sister     Diabetes Neg Hx      Stroke Neg Hx     Heart attack Neg Hx     Thyroid disease Neg Hx     Hyperlipidemia Neg Hx      Social History     Socioeconomic History    Marital status: Single   Occupational History    Occupation:    Tobacco Use    Smoking status: Never    Smokeless tobacco: Never   Substance and Sexual Activity    Alcohol use: Yes    Drug use: Yes     Frequency: 1.0 times per week     Types: Marijuana    Sexual activity: Yes     Partners: Female     Birth control/protection: Condom     Comment: 2 life-time partners     Medication List with Changes/Refills   Current Medications    ALBUTEROL (ACCUNEB) 0.63 MG/3 ML NEBU    Take 0.63 mg by nebulization every 6 (six) hours as needed. Rescue    ALBUTEROL (PROVENTIL/VENTOLIN HFA) 90 MCG/ACTUATION INHALER    Inhale 2 puffs into the lungs every 6 (six) hours as needed for Wheezing or Shortness of Breath. Rescue    FEXOFENADINE (ALLEGRA) 60 MG TABLET    Take 60 mg by mouth daily as needed.    NAPROXEN (NAPROSYN) 250 MG TABLET    SMARTSI-2 Tablet(s) By Mouth Every 12 Hours PRN     Review of patient's allergies indicates:  No Known Allergies  ROS    Physical Exam:   Body mass index is 44.3 kg/m².  There were no vitals filed for this visit.   GENERAL: Well appearing, appropriate for stated age, no acute distress.  CARDIOVASCULAR: Pulses regular by peripheral palpation.  PULMONARY: Respirations are even and non-labored.  NEURO: Awake, alert, and oriented x 3.  PSYCH: Mood & affect are appropriate.  HEENT: Head is normocephalic and atraumatic.  Ortho/SPM Exam  Right Knee:    Inspection: mild effusion    Palpation tenderness: Medial distal quadriceps     Range of motion: -5 deg extension - 120 deg flexion    Strength:  4+/5 Extension    5/5 Flexion    5/5 Hip Abduction    Varus (-)  Valgus (-)  Lachman (-)  Zelalem (-)  Anterior Drawer (-)  Posterior drawer (-)  Extensor mechanism intact  Intact EHL, FHL, gastrocsoleus, and tibialis anterior. Sensation intact to light  touch in superficial peroneal, deep peroneal, tibial, sural, and saphenous nerve distributions. Foot warm and well perfused with capillary refill of less than 2 seconds and palpable pedal pulses.          Imaging:    MRI Knee Without Contrast Right  Narrative: EXAM: MRI KNEE WITHOUT CONTRAST RIGHT    CLINICAL HISTORY: Chronic right knee pain.    TECHNIQUE: Standard multiplanar pulse sequences obtained without IV or intra-articular contrast.    COMPARISON: None.    FINDINGS: Normal cruciate ligaments.  Mild soft tissue edema superficial to the MCL compatible with a grade 1 sprain.  Lateral collateral complex normal.    High-grade partial tear of the distal quadriceps tendon predominately involving the rectus femoris and vastus medialis component.  Vastus intermedius and vastus lateralis components are intact.  Quadriceps tendon is retracted, beyond the field-of-view.  Associated full-thickness tear of the distal vastus medialis and upper medial patellar retinaculum, with a full-thickness defect measuring 3.9 cm in maximal dimension (series 4 image 8).  Joint fluid extravasates into the medial subcutaneous soft tissues.    Minimal intrasubstance signal medial and lateral menisci.    Articular cartilage is well preserved within the medial and lateral compartments.    Mild patella cristine with lateral patella tilt and 8 mm lateral patella subluxation.  No trochlea dysplasia.    Bony architecture and marrow signal are normal.  Moderate soft tissue edema anterior medial subcutaneous fat.  Remaining supporting soft tissues and musculature are normal.  Impression: High-grade partial tearing of the quadriceps tendon involving the rectus femoris and vastus medialis components.  Associated 3.9 cm full-thickness tear of the distal vastus medialis and superior medial patellar retinaculum.    Finalized on: 5/1/2023 10:25 AM By:  Ray Lujan MD  BRRG# [id number]      2023-05-01 10:27:16.016    BRRLEXIS      Relevant imaging results  reviewed and interpreted by me, discussed with the patient and / or family today.     Other Tests:         Patient Instructions   Assessment:  Daniel Hernandez III is a  27 y.o. male    with a chief complaint of Pain and Injury of the Right Knee    Right knee partial  Quad tendon tear  Decreased strength with knee extension  Right knee medial retinaculum tear  Early Osteoarthritis of both knees     Encounter Diagnoses   Name Primary?    Rupture of right quadriceps tendon, subsequent encounter Yes    Dislocation of right patella, subsequent encounter     Class 3 severe obesity with body mass index (BMI) of 40.0 to 44.9 in adult, unspecified obesity type, unspecified whether serious comorbidity present         Plan:  Continue with nonoperative management of physical therapy  May require surgical intervention in the future but patient wants to continue with non op    Follow-up: as needed or sooner if there are any problems between now and then.    Leave Review:   Google: Leave Google Review  Healthgrades: Leave Healthgrades Review    After Hours Number: [phone]       Provider Note/Medical Decision Making: large high grade full thickness tear, but extensor mechanism intact. This would be a major surgery with his obesity being a risk factor for being able to follow limited weight bearing restrictions. However, not addressing could lead to major issues as well. He would prefer to trial nonop because he feels happy with his function currently, understands implications of future knee condition. Failure of extensor mechanicsm would cause risk to his daily function and ability to walk. He prefers to continue trying therapy and does feel like he is seeing some iprovement.      I discussed worrisome and red flag signs and symptoms with the patient. The patient expressed understanding and agreed to alert me immediately or to go to the emergency room if they experience any of these.   Treatment plan was developed  with input from the patient/family, and they expressed understanding and agreement with the plan. All questions were answered today.          Ed Anand MD  Orthopaedic Surgery & Sports Medicine       Disclaimer: This note was prepared using a voice recognition system and is likely to have sound alike errors within the text.     I, Jessica Schafer, acted as a scribe for Ed Anand MD for the duration of this office visit.

## 2023-06-06 ENCOUNTER — CLINICAL SUPPORT (OUTPATIENT)
Dept: REHABILITATION | Facility: HOSPITAL | Age: 28
End: 2023-06-06
Payer: COMMERCIAL

## 2023-06-06 DIAGNOSIS — Z74.09 DECREASED FUNCTIONAL MOBILITY AND ENDURANCE: Primary | ICD-10-CM

## 2023-06-06 DIAGNOSIS — R26.89 POOR BALANCE: ICD-10-CM

## 2023-06-06 DIAGNOSIS — R29.898 WEAKNESS OF LOWER EXTREMITY, UNSPECIFIED LATERALITY: ICD-10-CM

## 2023-06-06 DIAGNOSIS — M62.81 PROXIMAL MUSCLE WEAKNESS: ICD-10-CM

## 2023-06-06 PROCEDURE — 97110 THERAPEUTIC EXERCISES: CPT

## 2023-06-06 PROCEDURE — 97530 THERAPEUTIC ACTIVITIES: CPT

## 2023-06-06 NOTE — PROGRESS NOTES
[facility]   Physical Therapy Treatment Note        Name: Daniel Hernandez III  Clinic Number: [id number]    Therapy Diagnosis:   Encounter Diagnoses   Name Primary?    Decreased functional mobility and endurance Yes    Proximal muscle weakness     Poor balance     Weakness of lower extremity, unspecified laterality        Physician: Ed Anand MD    Visit Date: 6/6/2023    Physician Orders: PT Eval and Treat  Medical Diagnosis from Referral: Rupture of right quadriceps tendon, subsequent encounter [S76.111D]  Evaluation Date: 5/11/2023  Authorization Period Expiration: 12/29/2023  Plan of Care Expiration: 8/11/2023  Progress Note Due: 6/11/2023  Visit # / Visits authorized: 2/25 (+1 for evaluation)   FOTO: 1/3 (last performed on 5/11/2023)     Precautions: Standard    Time In: 1100  Time Out: 1159  Total Billable Time: 53 minutes (Billing reflects 1 on 1 treatment time spent with patient)    Subjective     Patient reports: he is feeling good and notes that strength and function continue to improve     He/She was compliant with home exercise program.  Response to previous treatment: felt fine with no significant changes   Functional change: none noted at this time    Pain: 0/10     Location: R knee    Objective      Objective Measures updated at progress report or POC update only unless otherwise noted.       Treatment     Daniel received the treatments listed below:       MANUAL THERAPY TECHNIQUES were applied for (0) minutes, including:    Soft tissue mobilization:     Joint mobilizations:     Functional dry needling: DEFERRED        THERAPEUTIC EXERCISES to develop strength, endurance, ROM, flexibility, posture, and core stabilization for (10) minutes including:    Performed Today:     Upright bike level 5 for 5 minutes     Foam rolling quadriceps 2 minutes        Interventions DEFERRED today     Straight leg raise abduction    Lateral squat/monster walks GREEN 3 laps each          NEUROMUSCULAR RE-EDUCATION ACTIVITIES to improve Balance, Coordination, Kinesthetic, Sense, Proprioception, and Posture for (0) minutes.  The following were included:    Performed Today:      Interventions DEFERRED today     Single leg rebounder toss with kickstand 3x10 B   Tandem walking 8 laps forward and backward   Single leg stance 3x30s on R   TKE: purple cord 3x10            THERAPEUTIC ACTIVITIES to improve dynamic and functional  performance for (43) minutes including:    Performed Today:     Squats to box 20 inch 2x10   Goblet squats 25# 10x for HEP demonstration   RDL 25# 10x for HEP demonstration   Calf raises on wedge 3x10     Knee extensions   SL 25# 3x10 B     Hamstring curls   SL: 55# 10/8/6     Shuttle leg press   Double le bands 3x10   Single le bands 3x10 B     Hip thrusts 3x10      Interventions DEFERRED today              Next Session:  Side plank   Table top       Patient Education and Home Exercises       Home Exercises Provided and Patient Education Provided     Education provided: (time included with therex) minutes  PURPOSE: Patient educated on the impairments noted above and the effects of physical therapy intervention to improve overall condition and QOL.   EXERCISE: Patient was educated on all the above exercise prior/during/after for proper posture, positioning, and execution for safe performance with home exercise program.   STRENGTH: Patient educated on the importance of improved core and extremity strength in order to improve alignment of the spine and extremities with static positions and dynamic movement.   GAIT & BALANCE: Patient educated on the importance of strong core and lower extremity musculature in order to improve both static and dynamic balance, improve gait mechanics, reduce fall risk and improve household and community mobility.     Written Home Exercises Provided: yes.  Exercises were reviewed and Daniel was able to demonstrate them prior to the end of  the session.  Daniel demonstrated good  understanding of the education provided. See EMR under Patient Instructions for exercises provided during therapy sessions.    Assessment     Pt tolerated session well today; significant progressions made with no adverse reactions reported by pt.  Increased resistance tolerated well with knee extensions, hamstring curls and shuttle leg press today. Incorporated goblet squats and RDLs today to improve functional strength with good form noted.       Daniel is progressing well towards his goals.   Pt prognosis is Excellent.     Pt will continue to benefit from skilled outpatient physical therapy to address the deficits listed in the problem list box on initial evaluation, provide pt/family education and to maximize pt's level of independence in the home and community environment.     Pt's spiritual, cultural and educational needs considered and pt agreeable to plan of care and goals.     Anticipated Barriers for therapy: co-morbidities    Short Term Goals:  6 weeks Status  Date Met   PAIN: Pt will report worst pain of 1/10 in order to progress toward max functional ability and improve quality of life. [x] Progressing  [] Met  [] Not Met     FUNCTION: Patient will demonstrate improved function as indicated by a functional limitation score of less than or equal to NT out of 100 on FOTO. [x] Progressing  [] Met  [] Not Met     STRENGTH: Patient will improve strength to 50% of stated goals, listed in objective measures above, in order to progress towards independence with functional activities. [x] Progressing  [] Met  [] Not Met     HEP: Patient will demonstrate independence with HEP in order to progress toward functional independence. [x] Progressing  [] Met  [] Not Met        Long Term Goals:  12 weeks Status Date Met   PAIN: Pt will report worst pain of 0/10 in order to progress toward max functional ability and improve quality of life [x] Progressing  [] Met  [] Not Met      FUNCTION: Patient will demonstrate improved function as indicated by a functional limitation score of less than or equal to NT out of 100 on FOTO. [x] Progressing  [] Met  [] Not Met     STRENGTH: Patient will improve strength to stated goals, listed in objective measures above, in order to improve functional independence and quality of life.  [x] Progressing  [] Met  [] Not Met     Patient will demonstrate ability to get on and off of the floor with good form in order to improve functional mobility  [x] Progressing  [] Met  [] Not Met         Plan     Continue Plan of Care (POC) and progress per patient tolerance. See treatment section for details on planned progressions next session.      Awilda Logan, PT

## 2023-06-09 ENCOUNTER — CLINICAL SUPPORT (OUTPATIENT)
Dept: REHABILITATION | Facility: HOSPITAL | Age: 28
End: 2023-06-09
Payer: COMMERCIAL

## 2023-06-09 DIAGNOSIS — R29.898 WEAKNESS OF LOWER EXTREMITY, UNSPECIFIED LATERALITY: ICD-10-CM

## 2023-06-09 DIAGNOSIS — R26.89 POOR BALANCE: ICD-10-CM

## 2023-06-09 DIAGNOSIS — Z74.09 DECREASED FUNCTIONAL MOBILITY AND ENDURANCE: Primary | ICD-10-CM

## 2023-06-09 DIAGNOSIS — M62.81 PROXIMAL MUSCLE WEAKNESS: ICD-10-CM

## 2023-06-09 PROCEDURE — 97530 THERAPEUTIC ACTIVITIES: CPT

## 2023-06-09 PROCEDURE — 97110 THERAPEUTIC EXERCISES: CPT

## 2023-06-09 PROCEDURE — 97112 NEUROMUSCULAR REEDUCATION: CPT

## 2023-06-19 NOTE — PROGRESS NOTES
[facility]   Physical Therapy Treatment Note        Name: Daniel Hernandez III  Clinic Number: [id number]    Therapy Diagnosis:   Encounter Diagnoses   Name Primary?    Decreased functional mobility and endurance Yes    Proximal muscle weakness     Poor balance     Weakness of lower extremity, unspecified laterality        Physician: Ed Anand MD    Visit Date: 6/9/2023    Physician Orders: PT Eval and Treat  Medical Diagnosis from Referral: Rupture of right quadriceps tendon, subsequent encounter [S76.111D]  Evaluation Date: 5/11/2023  Authorization Period Expiration: 12/29/2023  Plan of Care Expiration: 8/11/2023  Progress Note Due: 6/11/2023  Visit # / Visits authorized: 4/25 (+1 for evaluation)   FOTO: 1/3 (last performed on 5/11/2023)     Precautions: Standard    Time In: 0900  Time Out: 1002  Total Billable Time: 57 minutes (Billing reflects 1 on 1 treatment time spent with patient)    Subjective     Patient reports: he is feeling good and notes that strength and function continue to improve     He/She was compliant with home exercise program.  Response to previous treatment: felt fine with no significant changes   Functional change: none noted at this time    Pain: 0/10     Location: R knee    Objective      Objective Measures updated at progress report or POC update only unless otherwise noted.       Treatment     Daniel received the treatments listed below:       MANUAL THERAPY TECHNIQUES were applied for (0) minutes, including:    Soft tissue mobilization:     Joint mobilizations:     Functional dry needling: DEFERRED        THERAPEUTIC EXERCISES to develop strength, endurance, ROM, flexibility, posture, and core stabilization for (12) minutes including:    Performed Today:     Treadmill walk: 9 minutes total (2 minute at incline 6: 1 minute at incline 0)     Foam rolling quadriceps 2 minutes        Interventions DEFERRED today     Straight leg raise abduction    Lateral  squat/monster walks GREEN 3 laps each         NEUROMUSCULAR RE-EDUCATION ACTIVITIES to improve Balance, Coordination, Kinesthetic, Sense, Proprioception, and Posture for (12) minutes.  The following were included:    Performed Today:     TKE: maroon cord 3x10   Single leg stance kettle swings   Ipsilateral: 30s B   Contralateral: 30s B   Pass: 30s B   Single leg stance rebounder toss with kickstand: 30x B    Interventions DEFERRED today     Tandem walking 8 laps forward and backward            THERAPEUTIC ACTIVITIES to improve dynamic and functional  performance for (33) minutes including:    Performed Today:     Knee extensions   SL 25# 10/8/6/4/2 reps with 10s holds on last rep    Hamstring curls   SL: 55# 10/8/6/4/2 reps with 10s holds on last rep    Leg press   DL: 88# 3x10   SL : 55# 3x10     Eccentric step ups: 8 inch 2x10 B   Side stepping: green band 4 laps          Interventions DEFERRED today     Hip thrusts 3x10   Squats to box 20 inch 2x10   Goblet squats 25# 10x for HEP demonstration   RDL 25# 10x for HEP demonstration   Calf raises on wedge 3x10          Next Session:  Cable sled pull       Patient Education and Home Exercises       Home Exercises Provided and Patient Education Provided     Education provided: (time included with therex) minutes  PURPOSE: Patient educated on the impairments noted above and the effects of physical therapy intervention to improve overall condition and QOL.   EXERCISE: Patient was educated on all the above exercise prior/during/after for proper posture, positioning, and execution for safe performance with home exercise program.   STRENGTH: Patient educated on the importance of improved core and extremity strength in order to improve alignment of the spine and extremities with static positions and dynamic movement.   GAIT & BALANCE: Patient educated on the importance of strong core and lower extremity musculature in order to improve both static and dynamic balance, improve  gait mechanics, reduce fall risk and improve household and community mobility.     Written Home Exercises Provided: yes.  Exercises were reviewed and Daniel was able to demonstrate them prior to the end of the session.  Daniel demonstrated good  understanding of the education provided. See EMR under Patient Instructions for exercises provided during therapy sessions.    Assessment     Pt tolerated session well today; significant progressions made with no adverse reactions reported by pt.  Added leg press to improved functional lower extremity strength with cueing required initially to improve control at end range extension. Added single leg stance kettle pass to improve single leg stability; cueing required initially to slow movements in order to improve control and reduce hip and trunk strategies. Continue foam rolling to reduce muscle tension in quadriceps.       Daniel is progressing well towards his goals.   Pt prognosis is Excellent.     Pt will continue to benefit from skilled outpatient physical therapy to address the deficits listed in the problem list box on initial evaluation, provide pt/family education and to maximize pt's level of independence in the home and community environment.     Pt's spiritual, cultural and educational needs considered and pt agreeable to plan of care and goals.     Anticipated Barriers for therapy: co-morbidities    Short Term Goals:  6 weeks Status  Date Met   PAIN: Pt will report worst pain of 1/10 in order to progress toward max functional ability and improve quality of life. [x] Progressing  [] Met  [] Not Met     FUNCTION: Patient will demonstrate improved function as indicated by a functional limitation score of less than or equal to NT out of 100 on FOTO. [x] Progressing  [] Met  [] Not Met     STRENGTH: Patient will improve strength to 50% of stated goals, listed in objective measures above, in order to progress towards independence with functional activities. [x]  Progressing  [] Met  [] Not Met     HEP: Patient will demonstrate independence with HEP in order to progress toward functional independence. [x] Progressing  [] Met  [] Not Met        Long Term Goals:  12 weeks Status Date Met   PAIN: Pt will report worst pain of 0/10 in order to progress toward max functional ability and improve quality of life [x] Progressing  [] Met  [] Not Met     FUNCTION: Patient will demonstrate improved function as indicated by a functional limitation score of less than or equal to NT out of 100 on FOTO. [x] Progressing  [] Met  [] Not Met     STRENGTH: Patient will improve strength to stated goals, listed in objective measures above, in order to improve functional independence and quality of life.  [x] Progressing  [] Met  [] Not Met     Patient will demonstrate ability to get on and off of the floor with good form in order to improve functional mobility  [x] Progressing  [] Met  [] Not Met         Plan     Continue Plan of Care (POC) and progress per patient tolerance. See treatment section for details on planned progressions next session.      Awilda Logan, PT

## 2023-06-23 ENCOUNTER — CLINICAL SUPPORT (OUTPATIENT)
Dept: REHABILITATION | Facility: HOSPITAL | Age: 28
End: 2023-06-23
Payer: COMMERCIAL

## 2023-06-23 DIAGNOSIS — Z74.09 DECREASED FUNCTIONAL MOBILITY AND ENDURANCE: Primary | ICD-10-CM

## 2023-06-23 DIAGNOSIS — R29.898 WEAKNESS OF LOWER EXTREMITY, UNSPECIFIED LATERALITY: ICD-10-CM

## 2023-06-23 DIAGNOSIS — M62.81 PROXIMAL MUSCLE WEAKNESS: ICD-10-CM

## 2023-06-23 DIAGNOSIS — R26.89 POOR BALANCE: ICD-10-CM

## 2023-06-23 PROCEDURE — 97110 THERAPEUTIC EXERCISES: CPT

## 2023-06-23 PROCEDURE — 97530 THERAPEUTIC ACTIVITIES: CPT

## 2023-06-23 PROCEDURE — 97112 NEUROMUSCULAR REEDUCATION: CPT

## 2023-06-30 NOTE — PROGRESS NOTES
[facility]   Physical Therapy Treatment Note        Name: Daniel Hernandez III  Clinic Number: [id number]    Therapy Diagnosis:   Encounter Diagnoses   Name Primary?    Decreased functional mobility and endurance Yes    Proximal muscle weakness     Poor balance     Weakness of lower extremity, unspecified laterality        Physician: Ed Anand MD    Visit Date: 6/23/2023    Physician Orders: PT Eval and Treat  Medical Diagnosis from Referral: Rupture of right quadriceps tendon, subsequent encounter [S76.111D]  Evaluation Date: 5/11/2023  Authorization Period Expiration: 12/29/2023  Plan of Care Expiration: 8/11/2023  Progress Note Due: 6/11/2023  Visit # / Visits authorized: 4/25 (+1 for evaluation)   FOTO: 1/3 (last performed on 5/11/2023)     Precautions: Standard    Time In: 0900  Time Out: 1002  Total Billable Time: 57 minutes (Billing reflects 1 on 1 treatment time spent with patient)    Subjective     Patient reports: he is feeling good and notes that strength and function continue to improve     He/She was compliant with home exercise program.  Response to previous treatment: felt fine with no significant changes   Functional change: none noted at this time    Pain: 0/10     Location: R knee    Objective      Objective Measures updated at progress report or POC update only unless otherwise noted.       Treatment     Daniel received the treatments listed below:       MANUAL THERAPY TECHNIQUES were applied for (0) minutes, including:    Soft tissue mobilization:     Joint mobilizations:     Functional dry needling: DEFERRED        THERAPEUTIC EXERCISES to develop strength, endurance, ROM, flexibility, posture, and core stabilization for (12) minutes including:    Performed Today:     Treadmill walk: 9 minutes total (2 minute at incline 6: 1 minute at incline 0)     Foam rolling quadriceps 2 minutes        Interventions DEFERRED today     Straight leg raise abduction     Lateral squat/monster walks GREEN 3 laps each         NEUROMUSCULAR RE-EDUCATION ACTIVITIES to improve Balance, Coordination, Kinesthetic, Sense, Proprioception, and Posture for (12) minutes.  The following were included:    Performed Today:     TKE: maroon cord 3x10   Single leg stance kettle swings   Ipsilateral: 30s B   Contralateral: 30s B   Pass: 30s B   Single leg stance rebounder toss with kickstand: 30x B    Interventions DEFERRED today     Tandem walking 8 laps forward and backward            THERAPEUTIC ACTIVITIES to improve dynamic and functional  performance for (33) minutes including:    Performed Today:     Knee extensions   SL 25# 10/8/6/4/2 reps with 10s holds on last rep    Hamstring curls   SL: 55# 10/8/6/4/2 reps with 10s holds on last rep    Leg press   DL: 88# 3x10   SL : 55# 3x10     Eccentric step ups: 8 inch 2x10 B   Side stepping: green band 4 laps          Interventions DEFERRED today     Hip thrusts 3x10   Squats to box 20 inch 2x10   Goblet squats 25# 10x for HEP demonstration   RDL 25# 10x for HEP demonstration   Calf raises on wedge 3x10          Next Session:  Cable sled pull       Patient Education and Home Exercises       Home Exercises Provided and Patient Education Provided     Education provided: (time included with therex) minutes  PURPOSE: Patient educated on the impairments noted above and the effects of physical therapy intervention to improve overall condition and QOL.   EXERCISE: Patient was educated on all the above exercise prior/during/after for proper posture, positioning, and execution for safe performance with home exercise program.   STRENGTH: Patient educated on the importance of improved core and extremity strength in order to improve alignment of the spine and extremities with static positions and dynamic movement.   GAIT & BALANCE: Patient educated on the importance of strong core and lower extremity musculature in order to improve both static and dynamic  balance, improve gait mechanics, reduce fall risk and improve household and community mobility.     Written Home Exercises Provided: yes.  Exercises were reviewed and Daniel was able to demonstrate them prior to the end of the session.  Daniel demonstrated good  understanding of the education provided. See EMR under Patient Instructions for exercises provided during therapy sessions.    Assessment     Pt tolerated session well today; significant progressions made with no adverse reactions reported by pt.  Added leg press to improved functional lower extremity strength with cueing required initially to improve control at end range extension. Added single leg stance kettle pass to improve single leg stability; cueing required initially to slow movements in order to improve control and reduce hip and trunk strategies. Continue foam rolling to reduce muscle tension in quadriceps.       Daniel is progressing well towards his goals.   Pt prognosis is Excellent.     Pt will continue to benefit from skilled outpatient physical therapy to address the deficits listed in the problem list box on initial evaluation, provide pt/family education and to maximize pt's level of independence in the home and community environment.     Pt's spiritual, cultural and educational needs considered and pt agreeable to plan of care and goals.     Anticipated Barriers for therapy: co-morbidities    Short Term Goals:  6 weeks Status  Date Met   PAIN: Pt will report worst pain of 1/10 in order to progress toward max functional ability and improve quality of life. [x] Progressing  [] Met  [] Not Met     FUNCTION: Patient will demonstrate improved function as indicated by a functional limitation score of less than or equal to NT out of 100 on FOTO. [x] Progressing  [] Met  [] Not Met     STRENGTH: Patient will improve strength to 50% of stated goals, listed in objective measures above, in order to progress towards independence with functional  activities. [x] Progressing  [] Met  [] Not Met     HEP: Patient will demonstrate independence with HEP in order to progress toward functional independence. [x] Progressing  [] Met  [] Not Met        Long Term Goals:  12 weeks Status Date Met   PAIN: Pt will report worst pain of 0/10 in order to progress toward max functional ability and improve quality of life [x] Progressing  [] Met  [] Not Met     FUNCTION: Patient will demonstrate improved function as indicated by a functional limitation score of less than or equal to NT out of 100 on FOTO. [x] Progressing  [] Met  [] Not Met     STRENGTH: Patient will improve strength to stated goals, listed in objective measures above, in order to improve functional independence and quality of life.  [x] Progressing  [] Met  [] Not Met     Patient will demonstrate ability to get on and off of the floor with good form in order to improve functional mobility  [x] Progressing  [] Met  [] Not Met         Plan     Continue Plan of Care (POC) and progress per patient tolerance. See treatment section for details on planned progressions next session.      Awilda Logan, PT

## 2023-07-25 ENCOUNTER — OFFICE VISIT (OUTPATIENT)
Dept: URGENT CARE | Facility: CLINIC | Age: 28
End: 2023-07-25
Payer: COMMERCIAL

## 2023-07-25 VITALS
HEART RATE: 100 BPM | WEIGHT: 315 LBS | BODY MASS INDEX: 40.43 KG/M2 | TEMPERATURE: 98 F | DIASTOLIC BLOOD PRESSURE: 87 MMHG | OXYGEN SATURATION: 96 % | HEIGHT: 74 IN | SYSTOLIC BLOOD PRESSURE: 134 MMHG | RESPIRATION RATE: 16 BRPM

## 2023-07-25 DIAGNOSIS — J02.9 SORE THROAT: ICD-10-CM

## 2023-07-25 DIAGNOSIS — Z87.09 HISTORY OF ASTHMA: ICD-10-CM

## 2023-07-25 DIAGNOSIS — B27.90 INFECTIOUS MONONUCLEOSIS WITHOUT COMPLICATION, INFECTIOUS MONONUCLEOSIS DUE TO UNSPECIFIED ORGANISM: Primary | ICD-10-CM

## 2023-07-25 LAB
CTP QC/QA: YES
CTP QC/QA: YES
HETEROPH AB SER QL: POSITIVE
MOLECULAR STREP A: NEGATIVE

## 2023-07-25 PROCEDURE — 86308 HETEROPHILE ANTIBODY SCREEN: CPT | Mod: QW,S$GLB,, | Performed by: PHYSICIAN ASSISTANT

## 2023-07-25 PROCEDURE — 99214 OFFICE O/P EST MOD 30 MIN: CPT | Mod: S$GLB,,, | Performed by: PHYSICIAN ASSISTANT

## 2023-07-25 PROCEDURE — 86308 POCT INFECTIOUS MONONUCLEOSIS: ICD-10-PCS | Mod: QW,S$GLB,, | Performed by: PHYSICIAN ASSISTANT

## 2023-07-25 PROCEDURE — 87651 POCT STREP A MOLECULAR: ICD-10-PCS | Mod: QW,S$GLB,, | Performed by: PHYSICIAN ASSISTANT

## 2023-07-25 PROCEDURE — 87651 STREP A DNA AMP PROBE: CPT | Mod: QW,S$GLB,, | Performed by: PHYSICIAN ASSISTANT

## 2023-07-25 PROCEDURE — 99214 PR OFFICE/OUTPT VISIT, EST, LEVL IV, 30-39 MIN: ICD-10-PCS | Mod: S$GLB,,, | Performed by: PHYSICIAN ASSISTANT

## 2023-07-25 RX ORDER — METHYLPREDNISOLONE 4 MG/1
TABLET ORAL
Qty: 21 EACH | Refills: 0 | Status: SHIPPED | OUTPATIENT
Start: 2023-07-25

## 2023-07-25 RX ORDER — ALBUTEROL SULFATE 90 UG/1
1-2 AEROSOL, METERED RESPIRATORY (INHALATION) EVERY 4 HOURS PRN
Qty: 8 G | Refills: 0 | Status: SHIPPED | OUTPATIENT
Start: 2023-07-25 | End: 2023-08-24

## 2023-07-25 NOTE — LETTER
July 25, 2023      [facility]  [address]  [place] [address]  Phone: [phone]       Patient: Daniel Hernandez III   YOB: 1995  Date of Visit: 07/25/2023    To Whom It May Concern:    Delmy Hernandez III  was at [facility] on 07/25/2023. The patient may return to work/school on 7/27/2023 with no restrictions. If you have any questions or concerns, or if I can be of further assistance, please do not hesitate to contact me.    Sincerely,      Calin Hernandez PA-C

## 2023-07-25 NOTE — PROGRESS NOTES
"Subjective:      Patient ID: Daniel Hernandez III is a 27 y.o. male.    Vitals:  height is 6' 2" (1.88 m) and weight is 156.5 kg (345 lb) (abnormal). His tympanic temperature is 97.6 °F (36.4 °C). His blood pressure is 134/87 and his pulse is 100. His respiration is 16 and oxygen saturation is 96%.     Chief Complaint: Sore Throat    26 y/o male presents to clinic with c/o of sore throat, congestion, and cough for 5 days. Reports Sunday he began experiencing body aches and worsening sore throat. States he experiencing pain with swallowing. Reports alternating Tylenol and Ibuprofen to help with pain with mild relief. Patient states he has been using prescribed albuterol inhaler which gave mild relief of cough. Also reports using allegra. Denies fever, chills, nausea, vomiting. Denies any sick contacts.    Sore Throat   This is a new problem. The current episode started in the past 7 days. The problem has been unchanged. Neither side of throat is experiencing more pain than the other. There has been no fever. The pain is at a severity of 7/10. The pain is moderate. Associated symptoms include congestion, coughing, headaches, a hoarse voice, a plugged ear sensation, neck pain and swollen glands. Pertinent negatives include no abdominal pain, diarrhea, drooling, ear discharge, ear pain, shortness of breath, stridor, trouble swallowing or vomiting. He has had no exposure to strep or mono. He has tried NSAIDs and acetaminophen for the symptoms. The treatment provided mild relief.     Constitution: Negative for chills, sweating, fatigue and fever.   HENT:  Positive for congestion, postnasal drip and sore throat. Negative for ear pain, ear discharge, drooling, sinus pressure and trouble swallowing.    Neck: Positive for neck pain.   Respiratory:  Positive for cough, sputum production and asthma. Negative for chest tightness, bloody sputum, COPD, shortness of breath and stridor.    Gastrointestinal:  Negative for abdominal " pain, nausea, vomiting, constipation and diarrhea.   Musculoskeletal:  Positive for pain and muscle ache.   Allergic/Immunologic: Positive for asthma.   Neurological:  Positive for headaches.    Objective:     Physical Exam   Constitutional: He is oriented to person, place, and time.  Non-toxic appearance. He does not appear ill. No distress.   HENT:   Head: Normocephalic and atraumatic.   Ears:   Right Ear: Tympanic membrane, external ear and ear canal normal.   Left Ear: Tympanic membrane, external ear and ear canal normal.   Nose: Nose normal. No rhinorrhea or congestion.   Mouth/Throat: Mucous membranes are normal. Mucous membranes are moist. Oropharyngeal exudate and posterior oropharyngeal erythema present. Tonsils are 3+ on the right. Tonsils are 3+ on the left. Tonsillar exudate.   Eyes: Conjunctivae are normal. No scleral icterus. Extraocular movement intact   Neck: Neck supple.   Cardiovascular: Normal rate, regular rhythm, normal heart sounds and normal pulses.   No murmur heard.Exam reveals no gallop and no friction rub.   Pulmonary/Chest: Effort normal and breath sounds normal. No stridor. No respiratory distress. He has no wheezes. He has no rhonchi. He has no rales. He exhibits no tenderness.   Abdominal: Normal appearance.   Lymphadenopathy:     He has cervical adenopathy.        Right cervical: Superficial cervical adenopathy present.   Neurological: He is alert and oriented to person, place, and time.   Skin: Skin is warm, dry, not diaphoretic and not pale.   Psychiatric: His behavior is normal. Mood, judgment and thought content normal.   Nursing note and vitals reviewed.    Assessment:     1. Infectious mononucleosis without complication, infectious mononucleosis due to unspecified organism    2. Sore throat    3. History of asthma        Plan:       Infectious mononucleosis without complication, infectious mononucleosis due to unspecified organism  -     methylPREDNISolone (MEDROL DOSEPACK) 4 mg  tablet; use as directed  Dispense: 21 each; Refill: 0    Sore throat  -     POCT Strep A, Molecular  -     POCT Infectious mononucleosis antibody  -     methylPREDNISolone (MEDROL DOSEPACK) 4 mg tablet; use as directed  Dispense: 21 each; Refill: 0    History of asthma  -     albuterol (PROVENTIL/VENTOLIN HFA) 90 mcg/actuation inhaler; Inhale 1-2 puffs into the lungs every 4 (four) hours as needed for Wheezing or Shortness of Breath (cough).  Dispense: 8 g; Refill: 0      Results for orders placed or performed in visit on 07/25/23   POCT Strep A, Molecular   Result Value Ref Range    Molecular Strep A, POC Negative Negative     Acceptable Yes    POCT Infectious mononucleosis antibody   Result Value Ref Range    Monospot Positive (A) Negative     Acceptable Yes        Patient positive for Mononucleosis.    Will treat with steroids due to significant tonsillar swelling.  Advised tylenol and/or Ibuprofen for pain and fever.  Increase oral fluid intake.  Avoid contact sports.  Encourage rest.  Follow up with PCP in 1 week.  Monitor symptoms and if any new or worsening symptoms go to the ER for further care.       Additional MDM:     Heart Failure Score:   COPD = No

## 2023-07-25 NOTE — PROGRESS NOTES
"Subjective:      Patient ID: Daniel Hernandez III is a 27 y.o. male.    Vitals:  height is 6' 2" (1.88 m) and weight is 156.5 kg (345 lb) (abnormal). His tympanic temperature is 97.6 °F (36.4 °C). His blood pressure is 134/87 and his pulse is 100. His respiration is 16 and oxygen saturation is 96%.     Chief Complaint: Sore Throat    26 y/o male presents to clinic with c/o sore throat, congestion, and cough for 5 days. Patient states he started with a sore throat, then congestion and cough developed. Patient states it's very hard to swallow. He has been alternating Tylenol and Ibuprofen to help with pain and gets slight relief. Patient states sore throat has slightly improved since on set, but congestion is still very bad. Patient states he has been using prescribed albuterol and allegra, but haven't gotten any relief.      Sore Throat   This is a new problem. The current episode started in the past 7 days. The problem has been unchanged. Neither side of throat is experiencing more pain than the other. There has been no fever. The pain is at a severity of 7/10. The pain is moderate. Associated symptoms include congestion, coughing, headaches, a hoarse voice, a plugged ear sensation, neck pain and trouble swallowing. Pertinent negatives include no abdominal pain, diarrhea, drooling, ear discharge, ear pain, shortness of breath, stridor, swollen glands or vomiting. He has had no exposure to strep or mono. He has tried NSAIDs and acetaminophen for the symptoms. The treatment provided mild relief.     HENT:  Positive for congestion, sore throat and trouble swallowing. Negative for ear pain, ear discharge and drooling.    Neck: Positive for neck pain.   Respiratory:  Positive for cough. Negative for shortness of breath and stridor.    Gastrointestinal:  Negative for abdominal pain, vomiting and diarrhea.   Neurological:  Positive for headaches.    Objective:     Physical Exam    Assessment:     No diagnosis " found.    Plan:       There are no diagnoses linked to this encounter.

## 2023-11-18 ENCOUNTER — OFFICE VISIT (OUTPATIENT)
Dept: URGENT CARE | Facility: CLINIC | Age: 28
End: 2023-11-18
Payer: COMMERCIAL

## 2023-11-18 VITALS
DIASTOLIC BLOOD PRESSURE: 83 MMHG | TEMPERATURE: 100 F | SYSTOLIC BLOOD PRESSURE: 136 MMHG | WEIGHT: 315 LBS | OXYGEN SATURATION: 98 % | HEIGHT: 74 IN | BODY MASS INDEX: 40.43 KG/M2 | RESPIRATION RATE: 18 BRPM | HEART RATE: 108 BPM

## 2023-11-18 DIAGNOSIS — R50.9 LOW GRADE FEVER: Primary | ICD-10-CM

## 2023-11-18 DIAGNOSIS — J45.909 MODERATE ASTHMA, UNSPECIFIED WHETHER COMPLICATED, UNSPECIFIED WHETHER PERSISTENT: ICD-10-CM

## 2023-11-18 DIAGNOSIS — J10.1 INFLUENZA B: ICD-10-CM

## 2023-11-18 DIAGNOSIS — Z76.0 ENCOUNTER FOR MEDICATION REFILL: ICD-10-CM

## 2023-11-18 DIAGNOSIS — R52 BODY ACHES: ICD-10-CM

## 2023-11-18 DIAGNOSIS — R05.1 ACUTE COUGH: ICD-10-CM

## 2023-11-18 LAB
CTP QC/QA: YES
POC MOLECULAR INFLUENZA A AGN: NEGATIVE
POC MOLECULAR INFLUENZA B AGN: POSITIVE

## 2023-11-18 PROCEDURE — 87502 POCT INFLUENZA A/B MOLECULAR: ICD-10-PCS | Mod: QW,S$GLB,,

## 2023-11-18 PROCEDURE — 87502 INFLUENZA DNA AMP PROBE: CPT | Mod: QW,S$GLB,,

## 2023-11-18 PROCEDURE — 99213 PR OFFICE/OUTPT VISIT, EST, LEVL III, 20-29 MIN: ICD-10-PCS | Mod: S$GLB,,,

## 2023-11-18 PROCEDURE — 99213 OFFICE O/P EST LOW 20 MIN: CPT | Mod: S$GLB,,,

## 2023-11-18 RX ORDER — PROMETHAZINE HYDROCHLORIDE AND DEXTROMETHORPHAN HYDROBROMIDE 6.25; 15 MG/5ML; MG/5ML
5 SYRUP ORAL NIGHTLY PRN
Qty: 118 ML | Refills: 0 | Status: SHIPPED | OUTPATIENT
Start: 2023-11-18 | End: 2023-11-28

## 2023-11-18 RX ORDER — ALBUTEROL SULFATE 90 UG/1
2 AEROSOL, METERED RESPIRATORY (INHALATION) EVERY 6 HOURS PRN
Qty: 8 G | Refills: 0 | Status: SHIPPED | OUTPATIENT
Start: 2023-11-18

## 2023-11-18 NOTE — PROGRESS NOTES
"Subjective:      Patient ID: Daniel Hernandez III is a 28 y.o. male.    Vitals:  height is 6' 2" (1.88 m) and weight is 154.2 kg (340 lb) (abnormal). His tympanic temperature is 100.1 °F (37.8 °C). His blood pressure is 136/83 and his pulse is 108. His respiration is 18 and oxygen saturation is 98%.     Chief Complaint: Cough    29 yo male Pt presents today with Flu Concerns. Pt states that he is experiencing cough, on and off chills, does not know if he ran a fever. States sore throat resolved. Has been treating symptoms with OTC medicines including tylenol/ibuprofen, dayquil. States he had received his flu shot. Also requesting medication refill of inhaler. Denies chest pain, shortness of breath, trouble swallowing, voice change. NKDA.      Cough  This is a new problem. The current episode started in the past 7 days. The problem has been unchanged. The cough is Productive of sputum. Associated symptoms include chills, myalgias, nasal congestion, postnasal drip and rhinorrhea. Pertinent negatives include no chest pain, ear congestion, ear pain, eye redness, headaches, heartburn, hemoptysis, rash, sore throat, shortness of breath, sweats, weight loss or wheezing. Nothing aggravates the symptoms. He has tried OTC cough suppressant and prescription cough suppressant for the symptoms. The treatment provided no relief.       Constitution: Positive for chills.   HENT:  Positive for congestion and postnasal drip. Negative for ear pain, ear discharge, sore throat, trouble swallowing and voice change.    Neck: Negative for neck pain and neck stiffness.   Cardiovascular:  Negative for chest pain.   Eyes:  Negative for eye discharge and eye redness.   Respiratory:  Positive for cough and asthma. Negative for bloody sputum, shortness of breath and wheezing.    Gastrointestinal:  Negative for heartburn.   Musculoskeletal:  Positive for muscle ache.   Skin:  Negative for rash and erythema.   Allergic/Immunologic: Positive for " asthma.   Neurological:  Negative for headaches.      Objective:     Vitals:    11/18/23 1329   BP: 136/83   Pulse: 108   Resp: 18   Temp: 100.1 °F (37.8 °C)       Physical Exam   Constitutional: He is oriented to person, place, and time. He appears well-developed. He is cooperative.  Non-toxic appearance. He does not appear ill. No distress.   HENT:   Head: Normocephalic and atraumatic. Head is without abrasion, without contusion and without laceration.   Ears:   Right Ear: Hearing, tympanic membrane, external ear and ear canal normal. No cerumen not present. Tympanic membrane is not erythematous and not bulging. No middle ear effusion. impacted cerumen  Left Ear: Hearing, tympanic membrane, external ear and ear canal normal. No cerumen not present. Tympanic membrane is not erythematous and not bulging.  No middle ear effusion. impacted cerumen  Nose: Nose normal. No mucosal edema, rhinorrhea or nasal deformity. No epistaxis. Right sinus exhibits no maxillary sinus tenderness and no frontal sinus tenderness. Left sinus exhibits no maxillary sinus tenderness and no frontal sinus tenderness.   Mouth/Throat: Uvula is midline, oropharynx is clear and moist and mucous membranes are normal. Mucous membranes are moist. No trismus in the jaw. Normal dentition. No uvula swelling. No oropharyngeal exudate, posterior oropharyngeal edema, posterior oropharyngeal erythema or tonsillar abscesses. Tonsils are 3+ on the right. Tonsils are 3+ on the left. No tonsillar exudate.   Eyes: Conjunctivae, EOM and lids are normal. Pupils are equal, round, and reactive to light. Right eye exhibits no discharge. Left eye exhibits no discharge. No scleral icterus.   Neck: Trachea normal and phonation normal. Neck supple. No edema present. No erythema present. No neck rigidity present.   Cardiovascular: Normal rate, regular rhythm, normal heart sounds and normal pulses.   Pulmonary/Chest: Effort normal and breath sounds normal. No accessory  muscle usage or stridor. No respiratory distress. He has no decreased breath sounds. He has no wheezes. He has no rhonchi. He has no rales.   Abdominal: Normal appearance.   Musculoskeletal: Normal range of motion.         General: No deformity. Normal range of motion.   Neurological: He is alert and oriented to person, place, and time. He exhibits normal muscle tone. Coordination and gait normal.   Skin: Skin is warm, dry, intact, not diaphoretic, not pale and no rash. Capillary refill takes less than 2 seconds. No abrasion, No burn, No bruising, No erythema and No ecchymosis   Psychiatric: His speech is normal and behavior is normal. Judgment and thought content normal.   Nursing note and vitals reviewed.      Assessment:     1. Low grade fever    2. Influenza B    3. Body aches    4. Encounter for medication refill    5. Moderate asthma, unspecified whether complicated, unspecified whether persistent    6. Cough        Results for orders placed or performed in visit on 11/18/23   POCT Influenza A/B MOLECULAR   Result Value Ref Range    POC Molecular Influenza A Ag Negative Negative, Not Reported    POC Molecular Influenza B Ag Positive (A) Negative, Not Reported     Acceptable Yes        Plan:       Low grade fever  -     POCT Influenza A/B MOLECULAR    Influenza B  -     promethazine-dextromethorphan (PROMETHAZINE-DM) 6.25-15 mg/5 mL Syrp; Take 5 mLs by mouth nightly as needed (cough).  Dispense: 118 mL; Refill: 0    Body aches    Encounter for medication refill  -     albuterol (PROVENTIL/VENTOLIN HFA) 90 mcg/actuation inhaler; Inhale 2 puffs into the lungs every 6 (six) hours as needed for Wheezing or Shortness of Breath. Rescue  Dispense: 8 g; Refill: 0    Moderate asthma, unspecified whether complicated, unspecified whether persistent  -     albuterol (PROVENTIL/VENTOLIN HFA) 90 mcg/actuation inhaler; Inhale 2 puffs into the lungs every 6 (six) hours as needed for Wheezing or Shortness of  Breath. Rescue  Dispense: 8 g; Refill: 0    Cough  -     albuterol (PROVENTIL/VENTOLIN HFA) 90 mcg/actuation inhaler; Inhale 2 puffs into the lungs every 6 (six) hours as needed for Wheezing or Shortness of Breath. Rescue  Dispense: 8 g; Refill: 0  -     promethazine-dextromethorphan (PROMETHAZINE-DM) 6.25-15 mg/5 mL Syrp; Take 5 mLs by mouth nightly as needed (cough).  Dispense: 118 mL; Refill: 0        Patient Instructions   FLU Discharge Instructions       FLU  Your Rapid FLU test was POSITIVE FOR INFLUENZA  If your condition worsens or fails to improve we recommend that you receive another evaluation at the ER immediately or contact your PCP to discuss your concerns or return here. You must understand that you've received an urgent care treatment only and that you may be released before all your medical problems are known or treated. You the patient will arrange for follouwp care as instructed.     - You are contagious for 24 hours after your last fever.  -  Zyrtec D, Claritin D or Allegra D can help with symptoms of congestion and drainage.   - Over the counter pseudoephedrine can be used as a decongestant but beware that this can raise your blood pressure.   -  If you just have drainage you can take plain Zyrtec, Claritin or Allegra   - You can also take Emergen-C to help boost your immune system.  -  Flonase (fluticasone) is a nasal spray which is available over the counter and may help with your symptoms.   -  Rest and fluids are also important.   - You can treat your symptoms with DayQuil, NyQuil, Cepacol and/or cough drops or prescribed cough medicine. Do not take additional Tylenol while taking Dayquil and/or Nyquil, since these medications contain Tylenol.  -  Tylenol (acetaminophen) or Motrin (ibuprofen) every 4 hours can also be used as directed for pain unless you have an allergy to them or medical condition such as stomach ulcers, kidney or liver disease or blood thinners etc for which you should not  be taking these type of medications. Do not exceed 4000 mg/day.  - Use Mucinex (guaifenisin) to break up mucous.  - Do not share any utensils or share drinks   - Wash hands frequently  - If for some reason your symptoms worsen or for any new or concerning symptoms, please return, see your primary care provider, or go to the emergency room.      Medical Decision Making:   History:   Old Medical Records: I decided to obtain old medical records.  Clinical Tests:   Lab Tests: Ordered and Reviewed       <> Summary of Lab: Flu B positive  Urgent Care Management:  Given duration of patient's symptoms, he does not make a good candidate for Tamiflu/Xofluza. Recommended to keep with conservative management and prescribed cough medicine. Patient does not need work excuse. Patient agreed to follow up with PCP if symptoms persist. No further questions or concerns. Patient leaves clinic with low grade fever in NAD, VSS, afebrile.

## 2023-11-18 NOTE — PATIENT INSTRUCTIONS
FLU Discharge Instructions       FLU  Your Rapid FLU test was POSITIVE FOR INFLUENZA  If your condition worsens or fails to improve we recommend that you receive another evaluation at the ER immediately or contact your PCP to discuss your concerns or return here. You must understand that you've received an urgent care treatment only and that you may be released before all your medical problems are known or treated. You the patient will arrange for follouwp care as instructed.     - You are contagious for 24 hours after your last fever.  -  Zyrtec D, Claritin D or Allegra D can help with symptoms of congestion and drainage.   - Over the counter pseudoephedrine can be used as a decongestant but beware that this can raise your blood pressure.   -  If you just have drainage you can take plain Zyrtec, Claritin or Allegra   - You can also take Emergen-C to help boost your immune system.  -  Flonase (fluticasone) is a nasal spray which is available over the counter and may help with your symptoms.   -  Rest and fluids are also important.   - You can treat your symptoms with DayQuil, NyQuil, Cepacol and/or cough drops or prescribed cough medicine. Do not take additional Tylenol while taking Dayquil and/or Nyquil, since these medications contain Tylenol.  -  Tylenol (acetaminophen) or Motrin (ibuprofen) every 4 hours can also be used as directed for pain unless you have an allergy to them or medical condition such as stomach ulcers, kidney or liver disease or blood thinners etc for which you should not be taking these type of medications. Do not exceed 4000 mg/day.  - Use Mucinex (guaifenisin) to break up mucous.  - Do not share any utensils or share drinks   - Wash hands frequently  - If for some reason your symptoms worsen or for any new or concerning symptoms, please return, see your primary care provider, or go to the emergency room.

## 2023-11-20 ENCOUNTER — TELEPHONE (OUTPATIENT)
Dept: URGENT CARE | Facility: CLINIC | Age: 28
End: 2023-11-20
Payer: COMMERCIAL

## 2024-07-11 ENCOUNTER — OFFICE VISIT (OUTPATIENT)
Dept: UROLOGY | Facility: CLINIC | Age: 29
End: 2024-07-11
Payer: COMMERCIAL

## 2024-07-11 VITALS — WEIGHT: 315 LBS | HEIGHT: 74 IN | BODY MASS INDEX: 40.43 KG/M2

## 2024-07-11 DIAGNOSIS — N41.9 PROSTATITIS, UNSPECIFIED PROSTATITIS TYPE: ICD-10-CM

## 2024-07-11 DIAGNOSIS — N50.811 PAIN IN BOTH TESTICLES: Primary | ICD-10-CM

## 2024-07-11 DIAGNOSIS — N50.812 PAIN IN BOTH TESTICLES: Primary | ICD-10-CM

## 2024-07-11 LAB
BILIRUB UR QL STRIP: NEGATIVE
GLUCOSE UR QL STRIP: NEGATIVE
KETONES UR QL STRIP: NEGATIVE
LEUKOCYTE ESTERASE UR QL STRIP: NEGATIVE
PH, POC UA: 5.5
POC BLOOD, URINE: NEGATIVE
POC NITRATES, URINE: NEGATIVE
PROT UR QL STRIP: NEGATIVE
SP GR UR STRIP: 1.03 (ref 1–1.03)
UROBILINOGEN UR STRIP-ACNC: 0.2 (ref 0.3–2.2)

## 2024-07-11 PROCEDURE — 99999 PR PBB SHADOW E&M-EST. PATIENT-LVL III: CPT | Mod: PBBFAC,,, | Performed by: UROLOGY

## 2024-07-11 NOTE — PROGRESS NOTES
Chief Complaint:   Encounter Diagnoses   Name Primary?    Pain in both testicles Yes    Prostatitis, unspecified prostatitis type        HPI:  HPI Daniel Hernandez III brandie 28 y.o. male who presents with complaints of bilateral testicular pain this started July 4th.  Patient states he was dehydrated and drinking alcohol.  He states he noticed some discomfort which has been slowly getting better.  He has been taking some ibuprofen as needed.  He has not had problems with testicular pain or prostatitis in the past.  He has no family history of prostate cancer.  He denies any injuries to his testicles.  He states that the pain worse got to a 5/10.  He states it is intermittent.  It is worse when he sitting.  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    History:  Social History     Tobacco Use    Smoking status: Never     Passive exposure: Never    Smokeless tobacco: Never   Substance Use Topics    Alcohol use: Yes    Drug use: Yes     Frequency: 1.0 times per week     Types: Marijuana     Past Medical History:   Diagnosis Date    Asthma     History of 2019 novel coronavirus disease (COVID-19) 06/2020     Past Surgical History:   Procedure Laterality Date    None       Family History   Problem Relation Name Age of Onset    No Known Problems Mother      Hypertension Father      Multiple myeloma Sister      Diabetes Neg Hx      Stroke Neg Hx      Heart attack Neg Hx      Thyroid disease Neg Hx      Hyperlipidemia Neg Hx         Current Outpatient Medications on File Prior to Visit   Medication Sig Dispense Refill    albuterol (PROVENTIL/VENTOLIN HFA) 90 mcg/actuation inhaler Inhale 2 puffs into the lungs every 6 (six) hours as needed for Wheezing or Shortness of Breath. Rescue 8 g 0    fexofenadine (ALLEGRA) 60 MG tablet Take 60 mg by mouth daily as needed.      [DISCONTINUED] methylPREDNISolone (MEDROL DOSEPACK) 4 mg tablet use as directed 21 each 0    [DISCONTINUED] naproxen (NAPROSYN) 250  "MG tablet SMARTSI-2 Tablet(s) By Mouth Every 12 Hours PRN      albuterol (ACCUNEB) 0.63 mg/3 mL Nebu Take 0.63 mg by nebulization every 6 (six) hours as needed. Rescue       No current facility-administered medications on file prior to visit.        Objective:     Vitals:    24 1021   Weight: (!) 160.3 kg (353 lb 6.4 oz)   Height: 6' 2" (1.88 m)      BMI Readings from Last 1 Encounters:   24 45.37 kg/m²          Physical Exam    No acute distress alert and oriented   Respirations even unlabored   Abdomen is obese   Bilateral testicles are palpably normal nontender    Lab Results   Component Value Date    CREATININE 1.1 2021      Assessment:       1. Pain in both testicles    2. Prostatitis, unspecified prostatitis type        Plan:     1. Pain in both testicles    2. Prostatitis, unspecified prostatitis type           Bilateral testicular pain appears to be related to a episode of dehydration.  I suspect possible prostatitis.  Discussed that if it continues to improve would recommend anti-inflammatories for now.  If he is still having symptoms after week I would recommend a course of antibiotic therapy.  He will notify us how he has doing in a week to let us know if he needs further treatment.  Discussed that most testicular pain is referred.  If this becomes worse or persistent may need scrotal ultrasound and further evaluation.  "

## 2024-12-24 ENCOUNTER — OFFICE VISIT (OUTPATIENT)
Dept: URGENT CARE | Facility: CLINIC | Age: 29
End: 2024-12-24
Payer: COMMERCIAL

## 2024-12-24 ENCOUNTER — HOSPITAL ENCOUNTER (OUTPATIENT)
Dept: RADIOLOGY | Facility: CLINIC | Age: 29
Discharge: HOME | End: 2024-12-24
Attending: FAMILY MEDICINE
Payer: COMMERCIAL

## 2024-12-24 VITALS
WEIGHT: 315 LBS | TEMPERATURE: 99 F | OXYGEN SATURATION: 96 % | RESPIRATION RATE: 16 BRPM | HEART RATE: 88 BPM | SYSTOLIC BLOOD PRESSURE: 114 MMHG | HEIGHT: 74 IN | DIASTOLIC BLOOD PRESSURE: 88 MMHG | BODY MASS INDEX: 40.43 KG/M2

## 2024-12-24 DIAGNOSIS — J45.901 EXACERBATION OF ASTHMA, UNSPECIFIED ASTHMA SEVERITY, UNSPECIFIED WHETHER PERSISTENT: Primary | ICD-10-CM

## 2024-12-24 DIAGNOSIS — J22 ACUTE LOWER RESPIRATORY INFECTION: ICD-10-CM

## 2024-12-24 DIAGNOSIS — R09.89 CHEST CONGESTION: ICD-10-CM

## 2024-12-24 DIAGNOSIS — R05.9 COUGH, UNSPECIFIED TYPE: ICD-10-CM

## 2024-12-24 PROCEDURE — 71046 X-RAY EXAM CHEST 2 VIEWS: CPT | Mod: S$GLB,,, | Performed by: RADIOLOGY

## 2024-12-24 PROCEDURE — 94640 AIRWAY INHALATION TREATMENT: CPT | Mod: S$GLB,,, | Performed by: FAMILY MEDICINE

## 2024-12-24 PROCEDURE — 99214 OFFICE O/P EST MOD 30 MIN: CPT | Mod: 25,S$GLB,, | Performed by: FAMILY MEDICINE

## 2024-12-24 RX ORDER — METHYLPREDNISOLONE 4 MG/1
TABLET ORAL
Qty: 21 EACH | Refills: 0 | Status: SHIPPED | OUTPATIENT
Start: 2024-12-24 | End: 2025-01-14

## 2024-12-24 RX ORDER — ALBUTEROL SULFATE 90 UG/1
2 INHALANT RESPIRATORY (INHALATION) EVERY 4 HOURS PRN
Qty: 18 G | Refills: 1 | Status: SHIPPED | OUTPATIENT
Start: 2024-12-24

## 2024-12-24 RX ORDER — ALBUTEROL SULFATE 0.83 MG/ML
2.5 SOLUTION RESPIRATORY (INHALATION)
Status: COMPLETED | OUTPATIENT
Start: 2024-12-24 | End: 2024-12-24

## 2024-12-24 RX ORDER — IPRATROPIUM BROMIDE 0.5 MG/2.5ML
0.5 SOLUTION RESPIRATORY (INHALATION)
Status: COMPLETED | OUTPATIENT
Start: 2024-12-24 | End: 2024-12-24

## 2024-12-24 RX ORDER — AZITHROMYCIN 250 MG/1
TABLET, FILM COATED ORAL
Qty: 6 TABLET | Refills: 0 | Status: SHIPPED | OUTPATIENT
Start: 2024-12-24 | End: 2024-12-29

## 2024-12-24 RX ADMIN — IPRATROPIUM BROMIDE 0.5 MG: 0.5 SOLUTION RESPIRATORY (INHALATION) at 08:12

## 2024-12-24 RX ADMIN — ALBUTEROL SULFATE 2.5 MG: 0.83 SOLUTION RESPIRATORY (INHALATION) at 08:12

## 2024-12-24 NOTE — PATIENT INSTRUCTIONS
Thank you for allowing our team to take care of you today.  Your diagnosis is acute respiratory infection with asthma exacerbation.   Awaiting chest xray result from radiologist. If positive, our office will contact you.   Assume you are contagious so be careful around those around you.   Supportive care.  Symptom management as appropriate like the recommendations below.   If any symptoms continue or worsen, get an immediate medical provider/ER evaluation.  Followup here as needed.     SYMPTOM MEDICATIONS IF NEEDED AND APPROPRIATE:    You may gargle with warm salt water 4 times a day and as needed.     Drink plenty of fluids.    Make sure you are getting rest.    You can use cough drops or lozenges to soothe your sore throat.     You can vitamin C, D, zinc (example Emergen-C) to help boost your immune system.    Cough/congestion medication as needed and appropriate.     Albuterol inhaler 2 puffs every 4 to 6 hours.    Medrol steroid pack.    Zithromax antibiotic.     Nasal saline spray to keep nasal passages moist.    Humidifier can be used to keep moisture in the air.     Acetaminophen (Tylenol) can be alternated with Ibuprofen (Motrin, Advil) ever four hours if no allergy or restriction for fever/aches/pains.

## 2025-04-21 NOTE — PROGRESS NOTES
"Patient ID: Daniel Hernandez III is a 29 y.o. male.    Chief Complaint: Annual Exam (He is here for an annual visit. States he had a moment of anxiety of last week while driving, "bubbling" feeling from throat into brain lasting approximately 30 seconds. )      HPI  History of Present Illness    - Mr. David feldman presents for an annual checkup and to discuss a recent episode of neurological symptoms while driving, including a sensation from the neck to the brain and slight loss of motor skills.    Mr. David feldman reports an episode of neurological symptoms while driving 5 days ago. He describes a sensation from his neck up into his brain, lasting about 30 seconds, with a slight loss of motor skills. He maintained control of the vehicle. He compares this to a similar incident 5 years ago at a [employer] store, where he had a more severe loss of motor function and fell to his knees.    The day after the recent episode, he woke up with a headache, rating it 6-7 out of 10 in severity. He notes it was not as severe as a migraine but still quite noticeable. He mentions possible chest pain during the episode, though uncertain about this detail.    A few days before the driving incident, he fell on his buttocks and back while walking up short stairs. Following this fall, he had soreness in his upper back area, which he treated with ibuprofen for swelling. He mentions a history of a pinched nerve in college but states the recent fall did not aggravate this old injury.    He drives frequently for work as a , fixing medical equipment.    He denies panic attacks, claustrophobia, or issues with being around too many people.      ROS:  General: denies fever, denies chills, denies fatigue, denies weight gain, denies weight loss  Eyes: denies vision changes, denies redness, denies discharge  ENT: denies ear pain, denies nasal congestion, denies sore throat  Cardiovascular: complains of chest pain, " denies palpitations, denies lower extremity edema  Respiratory: denies cough, denies shortness of breath  Gastrointestinal: denies abdominal pain, denies nausea, denies vomiting, denies diarrhea, denies constipation, denies blood in stool  Genitourinary: denies dysuria, denies hematuria, denies frequency  Musculoskeletal: denies joint pain, denies muscle pain, complains of neck tension, complains of muscle tension, complains of back pain  Skin: denies rash, denies lesion  Neurological: complains of headache, denies dizziness, denies numbness, denies tingling, complains of decreased coordination  Psychiatric: denies anxiety, denies depression, denies sleep difficulty                   Objective     Current Medications[1]     Physical Exam    General: In no acute distress.  Head: Normocephalic. Non traumatic.  Eyes: PERRLA. EOMs full. Conjunctivae clear. Fundi grossly normal.  Ears: EACs clear. TMs normal.  Nose: Mucosa pink. Mucosa moist. No obstruction.  Throat: Clear. No exudates. No lesions.  Neck: Supple. No masses. No thyromegaly. No bruits. MUSCLE TENSION IN NECK.  Chest: Lungs clear. No rales. No rhonchi. No wheezes.  Heart: RRR. No murmurs. No rubs. No gallops.  Abdomen: Soft. No tenderness. No masses. BS normal.  : Normal external genitalia. No lesions. No discharge. No hernias  noted.  Back: Normal curvature. No scoliosis. No tenderness.  Extremities: Warm. Well perfused. No upper extremity edema. No lower extremity edema. FROM. No deformities. No joint erythema.  Neuro: No focal deficits appreciated. Good muscle tone. Normal response to visual stimuli. Normal response to auditory stimuli.  Skin: Normal. No rashes. No lesions noted.  Vitals: Normal blood pressure.               VITAL SIGNS:  Vitals:    04/22/25 0856   BP: 120/86   BP Location: Left arm   Patient Position: Sitting   Pulse: 64   Resp: 20   Temp: 97.4 °F (36.3 °C)   TempSrc: Tympanic   SpO2: 98%   Weight: (!) 162.7 kg (358 lb 11 oz)  "  Height: 6' 2" (1.88 m)       CURRENT BMI:   Body mass index is 46.05 kg/m².    LABS REVIEWED:    CBC:  Lab Results   Component Value Date    WBC 6.03 01/27/2021    RBC 4.91 01/27/2021    HGB 15.4 01/27/2021    HCT 47.6 01/27/2021    MCV 97 01/27/2021    MCH 31.4 (H) 01/27/2021    MCHC 32.4 01/27/2021    RDW 12.9 01/27/2021     01/27/2021    MPV 10.7 01/27/2021    GRAN 3.4 01/27/2021    GRAN 56.0 01/27/2021    LYMPH 2.0 01/27/2021    LYMPH 33.7 01/27/2021    MONO 0.5 01/27/2021    MONO 8.8 01/27/2021    EOS 0.0 01/27/2021    BASO 0.03 01/27/2021    EOSINOPHIL 0.5 01/27/2021    BASOPHIL 0.5 01/27/2021       CHEMISTRY:  Sodium   Date Value Ref Range Status   01/27/2021 138 136 - 145 mmol/L Final     Potassium   Date Value Ref Range Status   01/27/2021 4.4 3.5 - 5.1 mmol/L Final     Chloride   Date Value Ref Range Status   01/27/2021 104 95 - 110 mmol/L Final     CO2   Date Value Ref Range Status   01/27/2021 23 23 - 29 mmol/L Final     Glucose   Date Value Ref Range Status   01/27/2021 65 (L) 70 - 110 mg/dL Final     BUN   Date Value Ref Range Status   01/27/2021 19 6 - 20 mg/dL Final     Creatinine   Date Value Ref Range Status   01/27/2021 1.1 0.5 - 1.4 mg/dL Final     Calcium   Date Value Ref Range Status   01/27/2021 9.5 8.7 - 10.5 mg/dL Final     Total Protein   Date Value Ref Range Status   01/27/2021 7.6 6.0 - 8.4 g/dL Final     Albumin   Date Value Ref Range Status   01/27/2021 4.1 3.5 - 5.2 g/dL Final     Total Bilirubin   Date Value Ref Range Status   01/27/2021 0.5 0.1 - 1.0 mg/dL Final     Comment:     For infants and newborns, interpretation of results should be based  on gestational age, weight and in agreement with clinical  observations.    Premature Infant recommended reference ranges:  Up to 24 hours.............<8.0 mg/dL  Up to 48 hours............<12.0 mg/dL  3-5 days..................<15.0 mg/dL  6-29 days.................<15.0 mg/dL       Alkaline Phosphatase   Date Value Ref Range " "Status   01/27/2021 69 55 - 135 U/L Final     AST   Date Value Ref Range Status   01/27/2021 31 10 - 40 U/L Final     ALT   Date Value Ref Range Status   01/27/2021 28 10 - 44 U/L Final     Anion Gap   Date Value Ref Range Status   01/27/2021 11 8 - 16 mmol/L Final       LIPID PANEL:  Lab Results   Component Value Date    CHOL 185 01/27/2021     Lab Results   Component Value Date    TRIG 45 01/27/2021     Lab Results   Component Value Date    HDL 64 01/27/2021     Lab Results   Component Value Date    LDLCALC 112.0 01/27/2021       THYROID:  Lab Results   Component Value Date    TSH 0.617 01/27/2021       DIABETES:  No results found for: "LABA1C", "HGBA1C"  No results found for: "MICALBCREAT"    Assessment and Plan     1. Annual physical exam  Comments:  Will obtain CBC, CMP, TSH, A1c and lipid panel for regular health monitoring  Orders:  -     Hemoglobin A1C; Future; Expected date: 04/22/2025  -     Lipid Panel; Future; Expected date: 04/22/2025  -     TSH; Future; Expected date: 04/22/2025  -     Comprehensive Metabolic Panel; Future; Expected date: 04/22/2025  -     CBC Auto Differential; Future; Expected date: 04/22/2025    2. Class 3 severe obesity due to excess calories without serious comorbidity with body mass index (BMI) of 45.0 to 49.9 in adult  Comments:  BMI: 46.05. Recommend dietary changes and exercise    3. Encounter to establish care  Comments:  Patient wishes to establish care with Dr. Mckeon    4. Neck pain  Comments:  Sporatic in nature. Recommend being seen during an episode for better diagnosis        Assessment & Plan    CERVICALGIA (NECK PAIN):  - Identified significant muscle tension in the neck, potentially explaining the "bubbling" sensation and subsequent headache.  - Will treat as muscle tension for now, explaining that muscle tension in the neck could cause the perceived "bubbling" sensation and subsequent headache.  - Recommend getting a couples massage to address muscle tension.  - " Prescribed ibuprofen as needed for muscle soreness.           Follow up in about 1 year (around 4/22/2026) for In person, Physical, with PCP.  This note was generated with the assistance of ambient listening technology. Verbal consent was obtained by the patient and accompanying visitor(s) for the recording of patient appointment to facilitate this note. I attest to having reviewed and edited the generated note for accuracy, though some syntax or spelling errors may persist. Please contact the author of this note for any clarification.            [1]   Current Outpatient Medications:     albuterol (PROVENTIL/VENTOLIN HFA) 90 mcg/actuation inhaler, Inhale 2 puffs into the lungs every 6 (six) hours as needed for Wheezing or Shortness of Breath. Rescue, Disp: 8 g, Rfl: 0    albuterol (VENTOLIN HFA) 90 mcg/actuation inhaler, Inhale 2 puffs into the lungs every 4 (four) hours as needed for Wheezing or Shortness of Breath (persistent cough). Rescue, Disp: 18 g, Rfl: 1    fexofenadine (ALLEGRA) 60 MG tablet, Take 60 mg by mouth daily as needed., Disp: , Rfl:     albuterol (ACCUNEB) 0.63 mg/3 mL Nebu, Take 0.63 mg by nebulization every 6 (six) hours as needed. Rescue, Disp: , Rfl:

## 2025-04-22 ENCOUNTER — OFFICE VISIT (OUTPATIENT)
Dept: FAMILY MEDICINE | Facility: CLINIC | Age: 30
End: 2025-04-22
Payer: COMMERCIAL

## 2025-04-22 ENCOUNTER — LAB VISIT (OUTPATIENT)
Dept: LAB | Facility: HOSPITAL | Age: 30
End: 2025-04-22
Attending: INTERNAL MEDICINE
Payer: COMMERCIAL

## 2025-04-22 VITALS
TEMPERATURE: 97 F | BODY MASS INDEX: 40.43 KG/M2 | SYSTOLIC BLOOD PRESSURE: 120 MMHG | OXYGEN SATURATION: 98 % | HEART RATE: 64 BPM | RESPIRATION RATE: 20 BRPM | HEIGHT: 74 IN | WEIGHT: 315 LBS | DIASTOLIC BLOOD PRESSURE: 86 MMHG

## 2025-04-22 DIAGNOSIS — Z76.89 ENCOUNTER TO ESTABLISH CARE: ICD-10-CM

## 2025-04-22 DIAGNOSIS — M54.2 NECK PAIN: ICD-10-CM

## 2025-04-22 DIAGNOSIS — Z00.00 ANNUAL PHYSICAL EXAM: Primary | ICD-10-CM

## 2025-04-22 DIAGNOSIS — E66.813 CLASS 3 SEVERE OBESITY DUE TO EXCESS CALORIES WITHOUT SERIOUS COMORBIDITY WITH BODY MASS INDEX (BMI) OF 45.0 TO 49.9 IN ADULT: ICD-10-CM

## 2025-04-22 DIAGNOSIS — E66.01 CLASS 3 SEVERE OBESITY DUE TO EXCESS CALORIES WITHOUT SERIOUS COMORBIDITY WITH BODY MASS INDEX (BMI) OF 45.0 TO 49.9 IN ADULT: ICD-10-CM

## 2025-04-22 DIAGNOSIS — Z00.00 ANNUAL PHYSICAL EXAM: ICD-10-CM

## 2025-04-22 LAB
ABSOLUTE EOSINOPHIL (OHS): 0.09 K/UL
ABSOLUTE MONOCYTE (OHS): 0.69 K/UL (ref 0.3–1)
ABSOLUTE NEUTROPHIL COUNT (OHS): 3.3 K/UL (ref 1.8–7.7)
ALBUMIN SERPL BCP-MCNC: 4 G/DL (ref 3.5–5.2)
ALP SERPL-CCNC: 82 UNIT/L (ref 40–150)
ALT SERPL W/O P-5'-P-CCNC: 37 UNIT/L (ref 10–44)
ANION GAP (OHS): 6 MMOL/L (ref 8–16)
AST SERPL-CCNC: 27 UNIT/L (ref 11–45)
BASOPHILS # BLD AUTO: 0.03 K/UL
BASOPHILS NFR BLD AUTO: 0.5 %
BILIRUB SERPL-MCNC: 0.5 MG/DL (ref 0.1–1)
BUN SERPL-MCNC: 13 MG/DL (ref 6–20)
CALCIUM SERPL-MCNC: 9.6 MG/DL (ref 8.7–10.5)
CHLORIDE SERPL-SCNC: 106 MMOL/L (ref 95–110)
CHOLEST SERPL-MCNC: 171 MG/DL (ref 120–199)
CHOLEST/HDLC SERPL: 3.1 {RATIO} (ref 2–5)
CO2 SERPL-SCNC: 26 MMOL/L (ref 23–29)
CREAT SERPL-MCNC: 0.8 MG/DL (ref 0.5–1.4)
EAG (OHS): 105 MG/DL (ref 68–131)
ERYTHROCYTE [DISTWIDTH] IN BLOOD BY AUTOMATED COUNT: 12.8 % (ref 11.5–14.5)
GFR SERPLBLD CREATININE-BSD FMLA CKD-EPI: >60 ML/MIN/1.73/M2
GLUCOSE SERPL-MCNC: 92 MG/DL (ref 70–110)
HBA1C MFR BLD: 5.3 % (ref 4–5.6)
HCT VFR BLD AUTO: 44.8 % (ref 40–54)
HDLC SERPL-MCNC: 55 MG/DL (ref 40–75)
HDLC SERPL: 32.2 % (ref 20–50)
HGB BLD-MCNC: 14.6 GM/DL (ref 14–18)
IMM GRANULOCYTES # BLD AUTO: 0.02 K/UL (ref 0–0.04)
IMM GRANULOCYTES NFR BLD AUTO: 0.3 % (ref 0–0.5)
LDLC SERPL CALC-MCNC: 100.6 MG/DL (ref 63–159)
LYMPHOCYTES # BLD AUTO: 2.41 K/UL (ref 1–4.8)
MCH RBC QN AUTO: 30.8 PG (ref 27–31)
MCHC RBC AUTO-ENTMCNC: 32.6 G/DL (ref 32–36)
MCV RBC AUTO: 95 FL (ref 82–98)
NONHDLC SERPL-MCNC: 116 MG/DL
NUCLEATED RBC (/100WBC) (OHS): 0 /100 WBC
PLATELET # BLD AUTO: 264 K/UL (ref 150–450)
PMV BLD AUTO: 10.5 FL (ref 9.2–12.9)
POTASSIUM SERPL-SCNC: 4.8 MMOL/L (ref 3.5–5.1)
PROT SERPL-MCNC: 7.8 GM/DL (ref 6–8.4)
RBC # BLD AUTO: 4.74 M/UL (ref 4.6–6.2)
RELATIVE EOSINOPHIL (OHS): 1.4 %
RELATIVE LYMPHOCYTE (OHS): 36.9 % (ref 18–48)
RELATIVE MONOCYTE (OHS): 10.6 % (ref 4–15)
RELATIVE NEUTROPHIL (OHS): 50.3 % (ref 38–73)
SODIUM SERPL-SCNC: 138 MMOL/L (ref 136–145)
TRIGL SERPL-MCNC: 77 MG/DL (ref 30–150)
TSH SERPL-ACNC: 1.08 UIU/ML (ref 0.4–4)
WBC # BLD AUTO: 6.54 K/UL (ref 3.9–12.7)

## 2025-04-22 PROCEDURE — 84443 ASSAY THYROID STIM HORMONE: CPT

## 2025-04-22 PROCEDURE — 1159F MED LIST DOCD IN RCRD: CPT | Mod: CPTII,S$GLB,, | Performed by: INTERNAL MEDICINE

## 2025-04-22 PROCEDURE — 83036 HEMOGLOBIN GLYCOSYLATED A1C: CPT

## 2025-04-22 PROCEDURE — 3008F BODY MASS INDEX DOCD: CPT | Mod: CPTII,S$GLB,, | Performed by: INTERNAL MEDICINE

## 2025-04-22 PROCEDURE — 99999 PR PBB SHADOW E&M-EST. PATIENT-LVL III: CPT | Mod: PBBFAC,,, | Performed by: INTERNAL MEDICINE

## 2025-04-22 PROCEDURE — 99385 PREV VISIT NEW AGE 18-39: CPT | Mod: S$GLB,,, | Performed by: INTERNAL MEDICINE

## 2025-04-22 PROCEDURE — 80053 COMPREHEN METABOLIC PANEL: CPT

## 2025-04-22 PROCEDURE — 3074F SYST BP LT 130 MM HG: CPT | Mod: CPTII,S$GLB,, | Performed by: INTERNAL MEDICINE

## 2025-04-22 PROCEDURE — 80061 LIPID PANEL: CPT

## 2025-04-22 PROCEDURE — 3079F DIAST BP 80-89 MM HG: CPT | Mod: CPTII,S$GLB,, | Performed by: INTERNAL MEDICINE

## 2025-04-22 PROCEDURE — 36415 COLL VENOUS BLD VENIPUNCTURE: CPT | Mod: PO

## 2025-04-22 PROCEDURE — 85025 COMPLETE CBC W/AUTO DIFF WBC: CPT

## 2025-04-23 ENCOUNTER — RESULTS FOLLOW-UP (OUTPATIENT)
Dept: FAMILY MEDICINE | Facility: CLINIC | Age: 30
End: 2025-04-23

## 2025-05-01 ENCOUNTER — OFFICE VISIT (OUTPATIENT)
Dept: FAMILY MEDICINE | Facility: CLINIC | Age: 30
End: 2025-05-01
Payer: COMMERCIAL

## 2025-05-01 DIAGNOSIS — R13.10 DYSPHAGIA, UNSPECIFIED TYPE: Primary | ICD-10-CM

## 2025-05-01 PROCEDURE — 3044F HG A1C LEVEL LT 7.0%: CPT | Mod: CPTII,95,, | Performed by: INTERNAL MEDICINE

## 2025-05-01 PROCEDURE — 98006 SYNCH AUDIO-VIDEO EST MOD 30: CPT | Mod: 95,,, | Performed by: INTERNAL MEDICINE

## 2025-05-01 NOTE — PROGRESS NOTES
Patient ID: Daniel Hernandez III is a 29 y.o. male.    Chief Complaint: No chief complaint on file.      History of Present Illness    - Mr. David feldman presents with concerns about throat swelling and minor throbbing pain in the throat and chest after eating.    Mr. David feldman reports a small swelling of the throat and minor throbbing pain in the throat and chest after eating. These symptoms occur with any type of food consumption, ranging from a full meal to a protein bar. The onset appears to be recent, with the patient noticing them since a follow-up approximately 1-1.5 weeks ago. This is the first time he has had these symptoms.      ROS:  General: denies fever, denies chills, denies fatigue, denies weight gain, denies weight loss  Eyes: denies vision changes, denies redness, denies discharge  ENT: denies ear pain, denies nasal congestion, complains of sore throat, complains of difficulty swallowing, complains of sense of lump/mass in throat when swallowing  Cardiovascular: complains of chest pain, denies palpitations, denies lower extremity edema  Respiratory: denies cough, denies shortness of breath  Gastrointestinal: denies abdominal pain, denies nausea, denies vomiting, denies diarrhea, denies constipation, denies blood in stool  Genitourinary: denies dysuria, denies hematuria, denies frequency  Musculoskeletal: denies joint pain, denies muscle pain  Skin: denies rash, denies lesion  Neurological: denies headache, denies dizziness, denies numbness, denies tingling  Psychiatric: denies anxiety, denies depression, denies sleep difficulty            Physical Exam    General: In no acute distress.  Head: Normocephalic. Non traumatic.  Eyes: PERRLA. EOMs full. Conjunctivae clear. Fundi grossly normal.  Ears: EACs clear. TMs normal.  Nose: Mucosa pink. Mucosa moist. No obstruction.  Throat: Clear. No exudates. No lesions.  Neck: Supple. No masses. No thyromegaly. No bruits.  Chest: Lungs clear. No rales. No  rhonchi. No wheezes.  Heart: RRR. No murmurs. No rubs. No gallops.  Abdomen: Soft. No tenderness. No masses. BS normal.  : Normal external genitalia. No lesions. No discharge. No hernias  noted.  Back: Normal curvature. No scoliosis. No tenderness.  Extremities: Warm. Well perfused. No upper extremity edema. No lower extremity edema. FROM. No deformities. No joint erythema.  Neuro: No focal deficits appreciated. Good muscle tone. Normal response to visual stimuli. Normal response to auditory stimuli.  Skin: Normal. No rashes. No lesions noted.          Current Medications[1]            VITAL SIGNS:  There were no vitals filed for this visit.    CURRENT BMI:   There is no height or weight on file to calculate BMI.    LABS REVIEWED:    CBC:  Lab Results   Component Value Date    WBC 6.54 04/22/2025    RBC 4.74 04/22/2025    HGB 14.6 04/22/2025    HCT 44.8 04/22/2025    MCV 95 04/22/2025    MCH 30.8 04/22/2025    MCHC 32.6 04/22/2025    RDW 12.8 04/22/2025     04/22/2025    MPV 10.5 04/22/2025    GRAN 3.4 01/27/2021    GRAN 56.0 01/27/2021    LYMPH 36.9 04/22/2025    LYMPH 2.41 04/22/2025    MONO 10.6 04/22/2025    MONO 0.69 04/22/2025    EOS 1.4 04/22/2025    EOS 0.09 04/22/2025    BASO 0.03 01/27/2021    EOSINOPHIL 0.5 01/27/2021    BASOPHIL 0.5 04/22/2025    BASOPHIL 0.03 04/22/2025       CHEMISTRY:  Sodium   Date Value Ref Range Status   04/22/2025 138 136 - 145 mmol/L Final   01/27/2021 138 136 - 145 mmol/L Final     Potassium   Date Value Ref Range Status   04/22/2025 4.8 3.5 - 5.1 mmol/L Final   01/27/2021 4.4 3.5 - 5.1 mmol/L Final     Chloride   Date Value Ref Range Status   04/22/2025 106 95 - 110 mmol/L Final   01/27/2021 104 95 - 110 mmol/L Final     CO2   Date Value Ref Range Status   04/22/2025 26 23 - 29 mmol/L Final   01/27/2021 23 23 - 29 mmol/L Final     Glucose   Date Value Ref Range Status   04/22/2025 92 70 - 110 mg/dL Final   01/27/2021 65 (L) 70 - 110 mg/dL Final     BUN   Date Value Ref  Range Status   04/22/2025 13 6 - 20 mg/dL Final     Creatinine   Date Value Ref Range Status   04/22/2025 0.8 0.5 - 1.4 mg/dL Final     Calcium   Date Value Ref Range Status   04/22/2025 9.6 8.7 - 10.5 mg/dL Final   01/27/2021 9.5 8.7 - 10.5 mg/dL Final     Protein Total   Date Value Ref Range Status   04/22/2025 7.8 6.0 - 8.4 gm/dL Final     Total Protein   Date Value Ref Range Status   01/27/2021 7.6 6.0 - 8.4 g/dL Final     Albumin   Date Value Ref Range Status   04/22/2025 4.0 3.5 - 5.2 g/dL Final   01/27/2021 4.1 3.5 - 5.2 g/dL Final     Total Bilirubin   Date Value Ref Range Status   01/27/2021 0.5 0.1 - 1.0 mg/dL Final     Comment:     For infants and newborns, interpretation of results should be based  on gestational age, weight and in agreement with clinical  observations.    Premature Infant recommended reference ranges:  Up to 24 hours.............<8.0 mg/dL  Up to 48 hours............<12.0 mg/dL  3-5 days..................<15.0 mg/dL  6-29 days.................<15.0 mg/dL       Bilirubin Total   Date Value Ref Range Status   04/22/2025 0.5 0.1 - 1.0 mg/dL Final     Comment:     For infants and newborns, interpretation of results should be based   on gestational age, weight and in agreement with clinical   observations.    Premature Infant recommended reference ranges:   0-24 hours:  <8.0 mg/dL   24-48 hours: <12.0 mg/dL   3-5 days:    <15.0 mg/dL   6-29 days:   <15.0 mg/dL     Alkaline Phosphatase   Date Value Ref Range Status   01/27/2021 69 55 - 135 U/L Final     ALP   Date Value Ref Range Status   04/22/2025 82 40 - 150 unit/L Final     AST   Date Value Ref Range Status   04/22/2025 27 11 - 45 unit/L Final   01/27/2021 31 10 - 40 U/L Final     ALT   Date Value Ref Range Status   04/22/2025 37 10 - 44 unit/L Final   01/27/2021 28 10 - 44 U/L Final     Anion Gap   Date Value Ref Range Status   04/22/2025 6 (L) 8 - 16 mmol/L Final     eGFR   Date Value Ref Range Status   04/22/2025 >60 >60 mL/min/1.73/m2  "Final     Comment:     Estimated GFR calculated using the CKD-EPI creatinine (2021) equation.       LIPID PANEL:  Lab Results   Component Value Date    CHOL 171 04/22/2025    CHOL 185 01/27/2021     Lab Results   Component Value Date    TRIG 77 04/22/2025    TRIG 45 01/27/2021     Lab Results   Component Value Date    HDL 55 04/22/2025    HDL 64 01/27/2021     Lab Results   Component Value Date    LDLCALC 100.6 04/22/2025    LDLCALC 112.0 01/27/2021       THYROID:  Lab Results   Component Value Date    TSH 1.078 04/22/2025       DIABETES:  Lab Results   Component Value Date    HGBA1C 5.3 04/22/2025     No results found for: "MICALBCREAT"    Assessment and Plan     Assessment & Plan    DYSPHAGIA (THROAT SWELLING AND PAIN):  - Mr. David feldman reports small swelling and minor throbbing pain in throat after eating (ranging from protein bars to big meals).  - Assessed complaint and referred to otolaryngologist for evaluation; ENT office will contact the patient to schedule appointment.           1. Dysphagia, unspecified type  Comments:  Will refer to ENT for further evaluation on throat structures  Orders:  -     Ambulatory referral/consult to ENT; Future; Expected date: 05/08/2025           No follow-ups on file.  The patient location is: home  The chief complaint leading to consultation is: "lump in throat"    Visit type: audiovisual    Face to Face time with patient: 21  31 minutes of total time spent on the encounter, which includes face to face time and non-face to face time preparing to see the patient (eg, review of tests), Obtaining and/or reviewing separately obtained history, Documenting clinical information in the electronic or other health record, Independently interpreting results (not separately reported) and communicating results to the patient/family/caregiver, or Care coordination (not separately reported).         Each patient to whom he or she provides medical services by telemedicine is:  (1) " informed of the relationship between the physician and patient and the respective role of any other health care provider with respect to management of the patient; and (2) notified that he or she may decline to receive medical services by telemedicine and may withdraw from such care at any time.    Notes:     This note was generated with the assistance of ambient listening technology. Verbal consent was obtained by the patient and accompanying visitor(s) for the recording of patient appointment to facilitate this note. I attest to having reviewed and edited the generated note for accuracy, though some syntax or spelling errors may persist. Please contact the author of this note for any clarification.            [1]   Current Outpatient Medications:     albuterol (ACCUNEB) 0.63 mg/3 mL Nebu, Take 0.63 mg by nebulization every 6 (six) hours as needed. Rescue, Disp: , Rfl:     albuterol (PROVENTIL/VENTOLIN HFA) 90 mcg/actuation inhaler, Inhale 2 puffs into the lungs every 6 (six) hours as needed for Wheezing or Shortness of Breath. Rescue, Disp: 8 g, Rfl: 0    albuterol (VENTOLIN HFA) 90 mcg/actuation inhaler, Inhale 2 puffs into the lungs every 4 (four) hours as needed for Wheezing or Shortness of Breath (persistent cough). Rescue, Disp: 18 g, Rfl: 1    fexofenadine (ALLEGRA) 60 MG tablet, Take 60 mg by mouth daily as needed., Disp: , Rfl:

## 2025-05-02 ENCOUNTER — TELEPHONE (OUTPATIENT)
Dept: OTOLARYNGOLOGY | Facility: CLINIC | Age: 30
End: 2025-05-02
Payer: COMMERCIAL

## 2025-05-02 NOTE — TELEPHONE ENCOUNTER
----- Message from Shant sent at 5/2/2025 10:52 AM CDT -----  Contact: Pt.  .Type:  Needs Medical AdviceWho Called: PtWould the patient rather a call back or a response via [facility]?  Call Annie Call Back Number:  [phone] Information: pt. Is returning a call back to the office regarding coming in early for his 4:00 p.m. appt. On today 05/02/2025

## 2025-05-02 NOTE — TELEPHONE ENCOUNTER
Pt requesting to reschedule his appt today with Dr. Phillip. I rescheduled pt appt on May 8th at 8:45 at the [place] location with Dr. Phillip. Verbalized understanding.

## 2025-05-15 ENCOUNTER — OFFICE VISIT (OUTPATIENT)
Dept: OTOLARYNGOLOGY | Facility: CLINIC | Age: 30
End: 2025-05-15
Payer: COMMERCIAL

## 2025-05-15 VITALS — BODY MASS INDEX: 40.43 KG/M2 | WEIGHT: 315 LBS | HEIGHT: 74 IN

## 2025-05-15 DIAGNOSIS — R13.12 OROPHARYNGEAL DYSPHAGIA: ICD-10-CM

## 2025-05-15 DIAGNOSIS — K21.9 LARYNGOPHARYNGEAL REFLUX (LPR): ICD-10-CM

## 2025-05-15 DIAGNOSIS — R09.A2 GLOBUS SENSATION: Primary | ICD-10-CM

## 2025-05-15 PROCEDURE — 3044F HG A1C LEVEL LT 7.0%: CPT | Mod: CPTII,S$GLB,, | Performed by: STUDENT IN AN ORGANIZED HEALTH CARE EDUCATION/TRAINING PROGRAM

## 2025-05-15 PROCEDURE — 99204 OFFICE O/P NEW MOD 45 MIN: CPT | Mod: 25,S$GLB,, | Performed by: STUDENT IN AN ORGANIZED HEALTH CARE EDUCATION/TRAINING PROGRAM

## 2025-05-15 PROCEDURE — 31575 DIAGNOSTIC LARYNGOSCOPY: CPT | Mod: S$GLB,,, | Performed by: STUDENT IN AN ORGANIZED HEALTH CARE EDUCATION/TRAINING PROGRAM

## 2025-05-15 PROCEDURE — 1159F MED LIST DOCD IN RCRD: CPT | Mod: CPTII,S$GLB,, | Performed by: STUDENT IN AN ORGANIZED HEALTH CARE EDUCATION/TRAINING PROGRAM

## 2025-05-15 PROCEDURE — 3008F BODY MASS INDEX DOCD: CPT | Mod: CPTII,S$GLB,, | Performed by: STUDENT IN AN ORGANIZED HEALTH CARE EDUCATION/TRAINING PROGRAM

## 2025-05-15 PROCEDURE — 99999 PR PBB SHADOW E&M-EST. PATIENT-LVL III: CPT | Mod: PBBFAC,,, | Performed by: STUDENT IN AN ORGANIZED HEALTH CARE EDUCATION/TRAINING PROGRAM

## 2025-05-15 RX ORDER — PANTOPRAZOLE SODIUM 40 MG/1
40 TABLET, DELAYED RELEASE ORAL DAILY
Qty: 90 TABLET | Refills: 0 | Status: SHIPPED | OUTPATIENT
Start: 2025-05-15 | End: 2026-05-15

## 2025-05-15 NOTE — PROGRESS NOTES
Chief complaint:    Chief Complaint   Patient presents with    Dysphagia     Pt is coming in today for swallowing problems that started 3-4 weeks ago. Pt states that after eating feels like something is stuck in his throat. Pt states that his chest hurts sometimes while breathing.            Referring Provider:  Caity Mckeon, Do  [address] [id number]      History of present illness:     Mr. David LENZ is a 29 y.o. presenting for evaluation of dysphagia.     Onset: 3 weeks ago    [x]  Solid dysphagia   [x]  Liquid dysphagia  []  Coughing/choking with swallow  []  Delayed regurgitation or vomiting  []  Tobacco exposure  []  Unintentional weight loss  []  Recurrent pneumonia  [x]  Globus sensation - worse after eating, described as a pressure   []  Sensation of pills getting stuck  [x]  Heartburn or chest tightness  []  Voice change  []  Odynophagia  []  Otalgia  []  Neck mass  []  History of head & neck radiation  []  Neurologic disorder/symptoms    Additional symptoms include chest hurting while inhaling at times      Treatment has included: none      History      Past Medical History:   Past Medical History:   Diagnosis Date    Asthma     History of 2019 novel coronavirus disease (COVID-19) 06/2020         Past Surgical History:  Past Surgical History:   Procedure Laterality Date    None           Medications: Medication list reviewed. He  has a current medication list which includes the following prescription(s): albuterol, fexofenadine, albuterol, albuterol, and pantoprazole.     Allergies: Review of patient's allergies indicates:  No Known Allergies      Family history: family history includes Alcohol abuse in his paternal grandfather; Cancer in his sister; Hypertension in his father; Multiple myeloma in his sister; No Known Problems in his mother.         Social History          Alcohol use:  reports current alcohol use of about 8.0 standard drinks of alcohol per week.             "Tobacco:  reports that he has never smoked. He has never been exposed to tobacco smoke. He has never used smokeless tobacco.         Physical Examination      Vitals: Height 6' 2.4" (1.89 m), weight (!) 164.4 kg (362 lb 7 oz).      General: Well developed, well nourished, well hydrated.   Voice: no hoarseness, no dysarthria      Head/Face: Normocephalic, atraumatic. No scars or lesions. Facial musculature equal.     Eyes: No scleral icterus or conjunctival hemorrhage. EOMI. PERRLA.     Ears:     Right ear: No gross deformity. EAC is clear of debris and erythema. TM are intact with a pneumatized middle ear. No signs of retraction, fluid or infection.      Left ear: No gross deformity. EAC is clear of debris and erythema. TM are intact with a pneumatized middle ear. No signs of retraction, fluid or infection.      Nose: No gross deformity or lesions. No purulent discharge. No significant NSD.      Mouth/Oropharynx: Lips without any lesions. No mucosal lesions within the oropharynx. No tonsillar exudate or lesions. Pharyngeal walls symmetrical. Uvula midline. Tongue midline without lesions.     Neck: Trachea midline. No masses. No thyromegaly or nodules palpated.     Lymphatic: No lymphadenopathy in the neck.     Extremities: No cyanosis. Warm and well-perfused.     Skin: No scars or lesions on face or neck.      Neurologic: Moving all extremities without gross abnormality.CN II-XII grossly intact. House-Brackmann 1/6. No signs of nystagmus.          Data reviewed      Review of records:      I reviewed records from the referring provider's office visits.  These describe the history, workup, and/or treatment of this problem thus far:     Review PCP and Ed notes         Procedures:    Procedure -Transnasal fiberoptic laryngoscopy     Surgeon: Delfino Phillip M.D. .      Anesthesia: topical 0.05% oxymetazoline with 4% lidocaine      Complications: None.     Description of Procedure: With the patient in the sitting " position, topical lidocaine and oxymetazoline was applied to the nose. The scope was passed through the nose. Examination was carried out of the nose, nasopharynx, oropharynx, hypopharynx, and larynx with findings as noted above. Scope was removed. The patient tolerated the procedure well.      Findings: No masses or lesions in the nose, nasopharynx, oropharynx, hypopharynx, or larynx. Vocal fold abduction and adduction is normal. No pooling of secretions in the piriform sinuses, penetration, or aspiration.        Assessment/Plan:      1. Globus sensation    2. Oropharyngeal dysphagia    3. Laryngopharyngeal reflux (LPR)         The patient has evidence of laryngopharyngeal reflux (LPR).  We discussed behavioral modifications such as raising the head of the bed, avoiding tight clothing or belts, avoiding eating within 2 hours of laying down, and weight loss. We also discussed dietary changes that can improve acid reflux symptoms, including, but not limited to avoidance of caffeine, peppermints, alcohol, greasy and fried foods, tomato-based sauces, and tobacco. H2 blockers (ie. Pepcid) or antacids (ie. Tums) can help as well. However, for persisting chronic or daily symptoms, a trial of proton pump inhibitors (ie. Omeprazole) is recommended. This is to be taken 30 minutes prior to breakfast and consistently for at least 3-4 weeks before you may notice improvement in symptoms. The addition of alginate therapy (Reflux Raft or Reflux Gourmet) can be an excellent adjuvant natural therapy as well. We discussed that if symptoms do not improve in 2-3 months, or other concerning symptoms develop (odynophagia, dysphagia, weight loss or GI bleeding) they should alert our office or return for a repeat exam.       He will message in 1 month and if not significant improved, we will plan for MBS and GI referral.          Delfino Phillip MD  [facility]   [facility]  [address]  [place] [address]  P: [phone]  F: [phone]